# Patient Record
Sex: FEMALE | Race: WHITE | NOT HISPANIC OR LATINO | Employment: FULL TIME | URBAN - METROPOLITAN AREA
[De-identification: names, ages, dates, MRNs, and addresses within clinical notes are randomized per-mention and may not be internally consistent; named-entity substitution may affect disease eponyms.]

---

## 2017-01-12 DIAGNOSIS — R11.0 CHRONIC NAUSEA: Primary | ICD-10-CM

## 2017-01-12 RX ORDER — ONDANSETRON 4 MG/1
4 TABLET, FILM COATED ORAL 3 TIMES DAILY PRN
Qty: 42 TABLET | Refills: 5 | Status: SHIPPED | OUTPATIENT
Start: 2017-01-12 | End: 2017-07-31

## 2017-01-12 NOTE — TELEPHONE ENCOUNTER
Prescription approved per [facility] Refill [place].    Lauren Harvey RN  [facility]

## 2017-01-12 NOTE — TELEPHONE ENCOUNTER
ONDANSETRON 4 MG TABS      Last Written Prescription Date: 6/29/16  Last Fill Quantity: 42,  # refills: 5   Last Office Visit with G, P or [facility] prescribing provider: 4/22/16

## 2017-01-16 DIAGNOSIS — S13.9XXA SPRAIN OF NECK, INITIAL ENCOUNTER: Primary | ICD-10-CM

## 2017-01-16 NOTE — TELEPHONE ENCOUNTER
Cyclobenzaprine 10MG Tablets      Last Written Prescription Date:  9/7/16  Last Fill Quantity: 30,   # refills: 5  Last Office Visit with [facility], [facility] or [facility] prescribing provider: 4/22/16  Future Office visit:       Routing refill request to provider for review/approval because:  Drug not on the [facility], [facility] or [facility] refill protocol or controlled substance

## 2017-01-17 RX ORDER — CYCLOBENZAPRINE HCL 10 MG
10 TABLET ORAL
Qty: 30 TABLET | Refills: 5 | Status: SHIPPED | OUTPATIENT
Start: 2017-01-17 | End: 2017-07-31

## 2017-03-30 ENCOUNTER — OFFICE VISIT (OUTPATIENT)
Dept: FAMILY MEDICINE | Facility: CLINIC | Age: 42
End: 2017-03-30
Payer: COMMERCIAL

## 2017-03-30 VITALS — TEMPERATURE: 98 F | HEART RATE: 109 BPM | HEIGHT: 63 IN | OXYGEN SATURATION: 100 %

## 2017-03-30 DIAGNOSIS — F43.22 ADJUSTMENT DISORDER WITH ANXIETY: ICD-10-CM

## 2017-03-30 DIAGNOSIS — S13.9XXD SPRAIN OF NECK, SUBSEQUENT ENCOUNTER: ICD-10-CM

## 2017-03-30 DIAGNOSIS — M25.511 ACUTE PAIN OF RIGHT SHOULDER: Primary | ICD-10-CM

## 2017-03-30 PROCEDURE — 99213 OFFICE O/P EST LOW 20 MIN: CPT | Performed by: FAMILY MEDICINE

## 2017-03-30 NOTE — MR AVS SNAPSHOT
After Visit Summary   3/30/2017    Sade Noyola    MRN: [id number]           Patient Information     Date Of Birth          1975        Visit Information        Provider Department      3/30/2017 1:45 PM Chi Gates MD [facility]        Today's Diagnoses     Acute pain of right shoulder    -  1    Sprain of neck, subsequent encounter        Adjustment disorder with anxiety           Follow-ups after your visit        Additional Services     [facility] PT, HAND, AND CHIROPRACTIC REFERRAL       **This order will print in the [facility] Scheduling Office**    Physical Therapy, Hand Therapy and Chiropractic Care are available through:    *[facility]  *[facility]  *[facility]    Call one number to schedule at any of the above locations: [phone].    Your provider has referred you to: Physical Therapy at [facility] or [facility]    Indication/Reason for Referral: Neck Pain and Shoulder Pain  Onset of Illness: 2016  Therapy Orders: Evaluate and Treat  Special Programs: None  Special Request: None    Johanna Spencer      Additional Comments for the Therapist or Chiropractor:     Please be aware that coverage of these services is subject to the terms and limitations of your health insurance plan.  Call member services at your health plan with any benefit or coverage questions.      Please bring the following to your appointment:    *Your personal calendar for scheduling future appointments  *Comfortable clothing                  Who to contact     If you have questions or need follow up information about today's clinic visit or your schedule please contact [facility] directly at [phone].  Normal or non-critical lab and imaging results will be communicated to you by MyChart, letter or phone within 4 business days after the clinic has received the results. If you do not hear from us within 7 days, please contact the  "clinic through VouchedForhart or phone. If you have a critical or abnormal lab result, we will notify you by phone as soon as possible.  Submit refill requests through Hapzing or call your pharmacy and they will forward the refill request to us. Please allow 3 business days for your refill to be completed.          Additional Information About Your Visit        VouchedForhart Information     Hapzing lets you send messages to your doctor, view your test results, renew your prescriptions, schedule appointments and more. To sign up, go to [email or web address] . Click on \"Log in\" on the left side of the screen, which will take you to the Welcome page. Then click on \"Sign up Now\" on the right side of the page.     You will be asked to enter the access code listed below, as well as some personal information. Please follow the directions to create your username and password.     Your access code is: [email or web address]  Expires: 2017  2:48 PM     Your access code will  in 90 days. If you need help or a new code, please call your [place] clinic or [phone].        Care EveryWhere ID     This is your Care EveryWhere ID. This could be used by other organizations to access your [place] medical records  [id number]        Your Vitals Were     Pulse Temperature Height Pulse Oximetry          109 98  F (36.7  C) (Oral) 5' 3\" (1.6 m) 100%         Blood Pressure from Last 3 Encounters:   16 108/66   16 122/66   14 153/79    Weight from Last 3 Encounters:   16 118 lb (53.5 kg)   16 127 lb 3.2 oz (57.7 kg)   13 127 lb (57.6 kg)              We Performed the Following     [facility] PT, HAND, AND CHIROPRACTIC REFERRAL        Primary Care Provider Office Phone # Fax #    Chi Gates [phone]       [facility] [address]  [facility] [address]        Thank you!     Thank you for choosing [facility]  for your care. Our goal is always to " provide you with excellent care. Hearing back from our patients is one way we can continue to improve our services. Please take a few minutes to complete the written survey that you may receive in the mail after your visit with us. Thank you!             Your Updated Medication List - Protect others around you: Learn how to safely use, store and throw away your medicines at www.disposemymeds.org.          This list is accurate as of: 3/30/17  2:48 PM.  Always use your most recent med list.                   Brand Name Dispense Instructions for use    cyclobenzaprine 10 MG tablet    FLEXERIL    30 tablet    Take 1 tablet (10 mg) by mouth nightly as needed for muscle spasms       IBUPROFEN PO      Take  by mouth.       KEFLEX PO          MULTIVITAMINS PO      Take  by mouth.       ondansetron 4 MG tablet    ZOFRAN    42 tablet    Take 1 tablet (4 mg) by mouth 3 times daily as needed for nausea       PROBIOTIC PO      Take  by mouth.       TYLENOL 325 MG tablet   Generic drug:  acetaminophen     100    2 TABLETS EVERY 4 HOURS AS NEEDED

## 2017-03-30 NOTE — PROGRESS NOTES
"  SUBJECTIVE:                                                    Sade Noyola is a 41 year old female who presents to clinic today for the following health issues:      Joint Pain     Onset: Month     Description:   Location: right shoulder  Character: Dull ache    Intensity: mild better from today     Progression of Symptoms: better    Accompanying Signs & Symptoms:  Other symptoms: none    History:   Previous similar pain: no       Precipitating factors:   Trauma or overuse: no     Alleviating factors:  Improved by: nothing       Therapies Tried and outcome: Ibuprofen       Notes symptoms worse cyclically before menses and when going through stress/ anxiety ie work and family issues    Problem list and histories reviewed & adjusted, as indicated.  Additional history: as documented    Labs reviewed in EPIC    Reviewed and updated as needed this visit by clinical staff       Reviewed and updated as needed this visit by Provider         ROS:  Constitutional, HEENT, cardiovascular, pulmonary, gi and gu systems are negative, except as otherwise noted.    OBJECTIVE:                                                    Pulse 109  Temp 98  F (36.7  C) (Oral)  Ht 5' 3\" (1.6 m)  SpO2 100%  There is no height or weight on file to calculate BMI.  GENERAL: alert and fatigued  EYES: Eyes grossly normal to inspection, PERRL and conjunctivae and sclerae normal  HENT: ear canals and TM's normal, nose and mouth without ulcers or lesions  NECK: no adenopathy, no asymmetry, masses, or scars and thyroid normal to palpation  MS: peripheral pulses normal and Shoulder exam:  no swelling,   ligaments intact, FROM shoulder joint, subdeltoid tenderness, bicipital groove tenderness, remainder of shoulder exam is normal.  SKIN: no suspicious lesions or rashes  PSYCH: mentation appears normal, anxious and judgement and insight intact         ASSESSMENT/PLAN:                                                            1. Acute pain of " right shoulder  Worsened by stress  - [facility] PT, HAND, AND CHIROPRACTIC REFERRAL    NSAID, ice suggested  activity modification  referral to physical therapy  see primary care physician in follow up       2. Sprain of neck, subsequent encounter  As above  - [facility] PT, HAND, AND CHIROPRACTIC REFERRAL    3. Adjustment disorder with anxiety   family therapy/ exercise  Follow up only if unimproved.     Chi Gates MD  [facility]

## 2017-03-30 NOTE — PROGRESS NOTES
"Chief Complaint   Patient presents with     Musculoskeletal Problem       Initial Pulse 109  Temp 98  F (36.7  C) (Oral)  Ht 5' 3\" (1.6 m)  SpO2 100% Estimated body mass index is 20.9 kg/(m^2) as calculated from the following:    Height as of 7/17/16: 5' 3\" (1.6 m).    Weight as of 7/17/16: 118 lb (53.5 kg).  Medication Reconciliation: complete     Daria Rizvi MA      "

## 2017-04-29 ENCOUNTER — TRANSFERRED RECORDS (OUTPATIENT)
Dept: HEALTH INFORMATION MANAGEMENT | Facility: CLINIC | Age: 42
End: 2017-04-29

## 2017-05-03 ENCOUNTER — TRANSFERRED RECORDS (OUTPATIENT)
Dept: HEALTH INFORMATION MANAGEMENT | Facility: CLINIC | Age: 42
End: 2017-05-03

## 2017-05-12 ENCOUNTER — OFFICE VISIT (OUTPATIENT)
Dept: FAMILY MEDICINE | Facility: CLINIC | Age: 42
End: 2017-05-12
Payer: COMMERCIAL

## 2017-05-12 VITALS — HEIGHT: 63 IN | HEART RATE: 109 BPM | OXYGEN SATURATION: 99 % | TEMPERATURE: 98.8 F

## 2017-05-12 DIAGNOSIS — L50.9 HIVES: Primary | ICD-10-CM

## 2017-05-12 PROCEDURE — 99213 OFFICE O/P EST LOW 20 MIN: CPT | Performed by: FAMILY MEDICINE

## 2017-05-12 NOTE — MR AVS SNAPSHOT
"              After Visit Summary   2017    Sade Noyola    MRN: [id number]           Patient Information     Date Of Birth          1975        Visit Information        Provider Department      2017 2:30 PM Chi Gaets MD [facility]        Today's Diagnoses     Hives    -  1       Follow-ups after your visit        Who to contact     If you have questions or need follow up information about today's clinic visit or your schedule please contact [facility] directly at [phone].  Normal or non-critical lab and imaging results will be communicated to you by Qeexohart, letter or phone within 4 business days after the clinic has received the results. If you do not hear from us within 7 days, please contact the clinic through Qeexohart or phone. If you have a critical or abnormal lab result, we will notify you by phone as soon as possible.  Submit refill requests through Oyster or call your pharmacy and they will forward the refill request to us. Please allow 3 business days for your refill to be completed.          Additional Information About Your Visit        MyChart Information     Oyster lets you send messages to your doctor, view your test results, renew your prescriptions, schedule appointments and more. To sign up, go to [email or web address] . Click on \"Log in\" on the left side of the screen, which will take you to the Welcome page. Then click on \"Sign up Now\" on the right side of the page.     You will be asked to enter the access code listed below, as well as some personal information. Please follow the directions to create your username and password.     Your access code is: [email or web address]  Expires: 2017  2:48 PM     Your access code will  in 90 days. If you need help or a new code, please call your [facility] or [phone].        Care EveryWhere ID     This is your Care EveryWhere ID. This could be used by other " "organizations to access your [place] medical records  [id number]        Your Vitals Were     Pulse Temperature Height Pulse Oximetry          109 98.8  F (37.1  C) (Oral) 5' 3\" (1.6 m) 99%         Blood Pressure from Last 3 Encounters:   07/17/16 108/66   02/19/16 122/66   12/16/14 153/79    Weight from Last 3 Encounters:   07/17/16 118 lb (53.5 kg)   02/19/16 127 lb 3.2 oz (57.7 kg)   06/20/13 127 lb (57.6 kg)              Today, you had the following     No orders found for display       Primary Care Provider Office Phone # Fax #    Chi Gates [phone]       [facility] [address] [facility] [address]  [facility] [address]        Thank you!     Thank you for choosing [facility]  for your care. Our goal is always to provide you with excellent care. Hearing back from our patients is one way we can continue to improve our services. Please take a few minutes to complete the written survey that you may receive in the mail after your visit with us. Thank you!             Your Updated Medication List - Protect others around you: Learn how to safely use, store and throw away your medicines at www.disposemymeds.org.          This list is accurate as of: 5/12/17  3:07 PM.  Always use your most recent med list.                   Brand Name Dispense Instructions for use    cyclobenzaprine 10 MG tablet    FLEXERIL    30 tablet    Take 1 tablet (10 mg) by mouth nightly as needed for muscle spasms       IBUPROFEN PO      Take  by mouth.       KEFLEX PO          MULTIVITAMINS PO      Take  by mouth.       ondansetron 4 MG tablet    ZOFRAN    42 tablet    Take 1 tablet (4 mg) by mouth 3 times daily as needed for nausea       PROBIOTIC PO      Take  by mouth.       TYLENOL 325 MG tablet   Generic drug:  acetaminophen     100    2 TABLETS EVERY 4 HOURS AS NEEDED         "

## 2017-05-12 NOTE — PROGRESS NOTES
"Chief Complaint   Patient presents with     Derm Problem       Initial Pulse 109  Temp 98.8  F (37.1  C) (Oral)  Ht 5' 3\" (1.6 m)  SpO2 99% Estimated body mass index is 20.9 kg/(m^2) as calculated from the following:    Height as of 7/17/16: 5' 3\" (1.6 m).    Weight as of 7/17/16: 118 lb (53.5 kg).  Medication Reconciliation: complete     Daria Rizvi MA      "

## 2017-05-13 ENCOUNTER — TELEPHONE (OUTPATIENT)
Dept: NURSING | Facility: CLINIC | Age: 42
End: 2017-05-13

## 2017-05-13 NOTE — TELEPHONE ENCOUNTER
Call Type: Triage Call    Presenting Problem: Seen for HIves of unknown orgin  24 hours ago by  Dr Gates - who  recommended claritin but Rx  not working . Spoke  to her pharmacist , and recommended  switching to Zyrtec and that  she could take Zyrtec now after taking  on Claritin today already .  FNA encourage her to follow  pharmacist recommendation  for  switching OTC Rx; because pharmacist would be the expert in  pharmaceutical area and call back if any further problem.  Triage Note:  Guideline Title: No Guideline - Advice Per Reference (Adult)  Recommended Disposition: Call Local Agency Immediately  Original Inclination: Did not know what to do  Override Disposition:  Intended Action: Follow advice given  Physician Contacted: No  CALL LOCAL AGENCY IMMEDIATELY ?  YES  SEE ED IMMEDIATELY ? NO  CALL [facility] IMMEDIATELY ? NO  ACTIVATE  ? NO  CALL PROVIDER IMMEDIATELY ? NO  Physician Instructions:  Care Advice:

## 2017-05-15 ENCOUNTER — TRANSFERRED RECORDS (OUTPATIENT)
Dept: HEALTH INFORMATION MANAGEMENT | Facility: CLINIC | Age: 42
End: 2017-05-15

## 2017-05-23 ENCOUNTER — TELEPHONE (OUTPATIENT)
Dept: FAMILY MEDICINE | Facility: CLINIC | Age: 42
End: 2017-05-23

## 2017-05-23 DIAGNOSIS — L50.9 HIVES: Primary | ICD-10-CM

## 2017-05-23 RX ORDER — HYDROXYZINE PAMOATE 25 MG/1
25 CAPSULE ORAL 3 TIMES DAILY PRN
Qty: 30 CAPSULE | Refills: 1 | Status: SHIPPED | OUTPATIENT
Start: 2017-05-23 | End: 2020-03-09

## 2017-05-23 NOTE — TELEPHONE ENCOUNTER
Reason for call:  Patient reporting a symptom    Symptom or request: Sade is calling to say that she is having a reaction to a medication that Dr. Gates prescribed for her and has hives    Duration (how long have symptoms been present):     Have you been treated for this before?     Additional comments:     Phone Number patient can be reached at:  [phone]    Best Time:  anytime    Can we leave a detailed message on this number:  Not Applicable    Call taken on 5/23/2017 at 5:12 PM by Qi Paz

## 2017-05-23 NOTE — TELEPHONE ENCOUNTER
Called patient. Informed of MD message. She verbalizes her understanding, denies further questions at this time. She is coming in to clinic for follow up on 5/26     Lauren Harvey RN  [facility]

## 2017-05-23 NOTE — TELEPHONE ENCOUNTER
She can also try  Orders Placed This Encounter     hydrOXYzine (VISTARIL) 25 MG capsule     Sig: Take 1 capsule (25 mg) by mouth 3 times daily as needed for itching     Dispense:  30 capsule     Refill:  1

## 2017-05-23 NOTE — TELEPHONE ENCOUNTER
Telephone call received from patient. She reports hives on her arms, forearms and elbows which first appeared just prior to 5/12/17 visit. She states that overall the hives have improved but states that the hives flare-up on occasion.     She is currently taking: Claritin, benadryl at night and using hydrocortisone cream.     Patient is tearful and anxious on the phone. She denies shortness of breath. No anaphylaxis. She is voiding and having regular bowel movements. She reports not changing her detergent, denies food allergies. She reports drinking a lot of water.     Dr. Gates,     Patient called to ask if there is anything additional she should take related to hives. She is coming in to see you on Friday. She requests to speak with you directly.     Patient was informed that MD or nurse will return her call. Patient states her understanding, denies further questions at this time.     Thank you,  Lauren Harvey RN  [facility]

## 2017-05-26 ENCOUNTER — OFFICE VISIT (OUTPATIENT)
Dept: FAMILY MEDICINE | Facility: CLINIC | Age: 42
End: 2017-05-26
Payer: COMMERCIAL

## 2017-05-26 VITALS — HEART RATE: 116 BPM | TEMPERATURE: 99.4 F | HEIGHT: 63 IN | OXYGEN SATURATION: 96 %

## 2017-05-26 DIAGNOSIS — L50.9 URTICARIA: Primary | ICD-10-CM

## 2017-05-26 LAB
ERYTHROCYTE [DISTWIDTH] IN BLOOD BY AUTOMATED COUNT: 13.1 % (ref 10–15)
HCT VFR BLD AUTO: 43.7 % (ref 35–47)
HGB BLD-MCNC: 14.8 G/DL (ref 11.7–15.7)
MCH RBC QN AUTO: 30.6 PG (ref 26.5–33)
MCHC RBC AUTO-ENTMCNC: 33.9 G/DL (ref 31.5–36.5)
MCV RBC AUTO: 90 FL (ref 78–100)
PLATELET # BLD AUTO: 311 10E9/L (ref 150–450)
RBC # BLD AUTO: 4.84 10E12/L (ref 3.8–5.2)
WBC # BLD AUTO: 10.7 10E9/L (ref 4–11)

## 2017-05-26 PROCEDURE — 85027 COMPLETE CBC AUTOMATED: CPT | Performed by: FAMILY MEDICINE

## 2017-05-26 PROCEDURE — 36415 COLL VENOUS BLD VENIPUNCTURE: CPT | Performed by: FAMILY MEDICINE

## 2017-05-26 PROCEDURE — 99213 OFFICE O/P EST LOW 20 MIN: CPT | Performed by: FAMILY MEDICINE

## 2017-05-26 NOTE — MR AVS SNAPSHOT
"              After Visit Summary   2017    Sade Noyola    MRN: [id number]           Patient Information     Date Of Birth          1975        Visit Information        Provider Department      2017 4:00 PM Chi Gates MD [facility]        Today's Diagnoses     Urticaria    -  1       Follow-ups after your visit        Who to contact     If you have questions or need follow up information about today's clinic visit or your schedule please contact [facility] directly at [phone].  Normal or non-critical lab and imaging results will be communicated to you by Inoappshart, letter or phone within 4 business days after the clinic has received the results. If you do not hear from us within 7 days, please contact the clinic through Inoappshart or phone. If you have a critical or abnormal lab result, we will notify you by phone as soon as possible.  Submit refill requests through [employer] or call your pharmacy and they will forward the refill request to us. Please allow 3 business days for your refill to be completed.          Additional Information About Your Visit        MyChart Information     [employer] lets you send messages to your doctor, view your test results, renew your prescriptions, schedule appointments and more. To sign up, go to [email or web address] . Click on \"Log in\" on the left side of the screen, which will take you to the Welcome page. Then click on \"Sign up Now\" on the right side of the page.     You will be asked to enter the access code listed below, as well as some personal information. Please follow the directions to create your username and password.     Your access code is: [email or web address]  Expires: 2017  2:48 PM     Your access code will  in 90 days. If you need help or a new code, please call your [facility] or [phone].        Care EveryWhere ID     This is your Care EveryWhere ID. This could be used by other " "organizations to access your [place] medical records  [id number]        Your Vitals Were     Pulse Temperature Height Pulse Oximetry          116 99.4  F (37.4  C) (Oral) 5' 3\" (1.6 m) 96%         Blood Pressure from Last 3 Encounters:   07/17/16 108/66   02/19/16 122/66   12/16/14 153/79    Weight from Last 3 Encounters:   07/17/16 118 lb (53.5 kg)   02/19/16 127 lb 3.2 oz (57.7 kg)   06/20/13 127 lb (57.6 kg)              We Performed the Following     CBC with platelets        Primary Care Provider Office Phone # Fax #    Chi Jack Gates [phone]       [facility] [address] [facility] [address]  [facility] [address]        Thank you!     Thank you for choosing [facility]  for your care. Our goal is always to provide you with excellent care. Hearing back from our patients is one way we can continue to improve our services. Please take a few minutes to complete the written survey that you may receive in the mail after your visit with us. Thank you!             Your Updated Medication List - Protect others around you: Learn how to safely use, store and throw away your medicines at www.disposemymeds.org.          This list is accurate as of: 5/26/17  4:47 PM.  Always use your most recent med list.                   Brand Name Dispense Instructions for use    cyclobenzaprine 10 MG tablet    FLEXERIL    30 tablet    Take 1 tablet (10 mg) by mouth nightly as needed for muscle spasms       hydrOXYzine 25 MG capsule    VISTARIL    30 capsule    Take 1 capsule (25 mg) by mouth 3 times daily as needed for itching       IBUPROFEN PO      Take  by mouth.       KEFLEX PO          MULTIVITAMINS PO      Take  by mouth.       ondansetron 4 MG tablet    ZOFRAN    42 tablet    Take 1 tablet (4 mg) by mouth 3 times daily as needed for nausea       PROBIOTIC PO      Take  by mouth.       TYLENOL 325 MG tablet   Generic drug:  acetaminophen     100    2 TABLETS EVERY 4 HOURS AS NEEDED "

## 2017-05-26 NOTE — NURSING NOTE
"No chief complaint on file.      Initial Pulse 116  Temp 99.4  F (37.4  C) (Oral)  Ht 5' 3\" (1.6 m)  SpO2 96% Estimated body mass index is 20.9 kg/(m^2) as calculated from the following:    Height as of 7/17/16: 5' 3\" (1.6 m).    Weight as of 7/17/16: 118 lb (53.5 kg).  Medication Reconciliation: complete     Halley Walker MA      "

## 2017-05-26 NOTE — PROGRESS NOTES
"  SUBJECTIVE:                                                    Sade Noyola is a 42 year old female who presents to clinic today for the following health issues:    Concern - Follow up on hives from 5/12 visit     Onset: 2 weeks and 1 day ago    Description:   Forearms    Intensity: much better on arms, and saw them on her face when she was in the car    Progression of Symptoms:  improving    Accompanying Signs & Symptoms:  Warm from being outside       Previous history of similar problem:   No     Precipitating factors:   Worsened by: being in car    Alleviating factors:  Improved by: claritin, [id number]       Therapies Tried and outcome: Claritin       SAW derm ( not helpful)    Problem list and histories reviewed & adjusted, as indicated.  Additional history: as documented    Labs reviewed in EPIC    Reviewed and updated as needed this visit by clinical staff  Tobacco  Allergies  Meds       Reviewed and updated as needed this visit by Provider         ROS:  Constitutional, HEENT, cardiovascular, pulmonary, gi and gu systems are negative, except as otherwise noted.    OBJECTIVE:                                                    Pulse 116  Temp 99.4  F (37.4  C) (Oral)  Ht 5' 3\" (1.6 m)  SpO2 96%  There is no height or weight on file to calculate BMI.  GENERAL: alert and fatigued  NECK: no adenopathy, no asymmetry, masses, or scars and thyroid normal to palpation  RESP: lungs clear to auscultation - no rales, rhonchi or wheezes  CV: regular rate and rhythm, normal S1 S2, no S3 or S4, no murmur, click or rub, no peripheral edema and peripheral pulses strong  ABDOMEN: soft, nontender, no hepatosplenomegaly, no masses and bowel sounds normal  SKIN: wheal - face and lower legs  NEURO: Normal strength and tone, mentation intact and speech normal  PSYCH: mentation appears normal, affect normal/bright  LYMPH: no cervical, supraclavicular, axillary, or inguinal adenopathy       Results for orders placed " or performed in visit on 05/26/17   CBC with platelets   Result Value Ref Range    WBC 10.7 4.0 - 11.0 10e9/L    RBC Count 4.84 3.8 - 5.2 10e12/L    Hemoglobin 14.8 11.7 - 15.7 g/dL    Hematocrit 43.7 35.0 - 47.0 %    MCV 90 78 - 100 fl    MCH 30.6 26.5 - 33.0 pg    MCHC 33.9 31.5 - 36.5 g/dL    RDW 13.1 10.0 - 15.0 %    Platelet Count 311 150 - 450 10e9/L         ASSESSMENT/PLAN:                                                            1. Urticaria  Unclear if postviral vs due to cephalexin  - CBC with platelets    Continue claritin, reassurance   Follow up only if unimproved.   See Patient Instructions    Chi Gates MD  [facility]

## 2017-06-10 ENCOUNTER — HEALTH MAINTENANCE LETTER (OUTPATIENT)
Age: 42
End: 2017-06-10

## 2017-06-12 ENCOUNTER — OFFICE VISIT (OUTPATIENT)
Dept: FAMILY MEDICINE | Facility: CLINIC | Age: 42
End: 2017-06-12
Payer: COMMERCIAL

## 2017-06-12 DIAGNOSIS — L50.9 URTICARIA: Primary | ICD-10-CM

## 2017-06-12 PROCEDURE — 99212 OFFICE O/P EST SF 10 MIN: CPT | Performed by: FAMILY MEDICINE

## 2017-06-12 NOTE — MR AVS SNAPSHOT
"              After Visit Summary   2017    Sade Noyola    MRN: [id number]           Patient Information     Date Of Birth          1975        Visit Information        Provider Department      2017 4:00 PM Chi Gates MD [facility]        Today's Diagnoses     Urticaria    -  1       Follow-ups after your visit        Who to contact     If you have questions or need follow up information about today's clinic visit or your schedule please contact [facility] directly at [phone].  Normal or non-critical lab and imaging results will be communicated to you by VitalFieldshart, letter or phone within 4 business days after the clinic has received the results. If you do not hear from us within 7 days, please contact the clinic through VitalFieldshart or phone. If you have a critical or abnormal lab result, we will notify you by phone as soon as possible.  Submit refill requests through Dajiabao or call your pharmacy and they will forward the refill request to us. Please allow 3 business days for your refill to be completed.          Additional Information About Your Visit        MyChart Information     Dajiabao lets you send messages to your doctor, view your test results, renew your prescriptions, schedule appointments and more. To sign up, go to [email or web address] . Click on \"Log in\" on the left side of the screen, which will take you to the Welcome page. Then click on \"Sign up Now\" on the right side of the page.     You will be asked to enter the access code listed below, as well as some personal information. Please follow the directions to create your username and password.     Your access code is: [email or web address]  Expires: 2017  2:48 PM     Your access code will  in 90 days. If you need help or a new code, please call your [facility] or [phone].        Care EveryWhere ID     This is your Care EveryWhere ID. This could be used by other " organizations to access your [place] medical records  [id number]         Blood Pressure from Last 3 Encounters:   07/17/16 108/66   02/19/16 122/66   12/16/14 153/79    Weight from Last 3 Encounters:   07/17/16 118 lb (53.5 kg)   02/19/16 127 lb 3.2 oz (57.7 kg)   06/20/13 127 lb (57.6 kg)              Today, you had the following     No orders found for display       Primary Care Provider Office Phone # Fax #    Chi Jack Gates [phone]       [facility] [address]  [facility] [address]        Thank you!     Thank you for choosing [facility]  for your care. Our goal is always to provide you with excellent care. Hearing back from our patients is one way we can continue to improve our services. Please take a few minutes to complete the written survey that you may receive in the mail after your visit with us. Thank you!             Your Updated Medication List - Protect others around you: Learn how to safely use, store and throw away your medicines at www.disposemymeds.org.          This list is accurate as of: 6/12/17  5:09 PM.  Always use your most recent med list.                   Brand Name Dispense Instructions for use    cyclobenzaprine 10 MG tablet    FLEXERIL    30 tablet    Take 1 tablet (10 mg) by mouth nightly as needed for muscle spasms       hydrOXYzine 25 MG capsule    VISTARIL    30 capsule    Take 1 capsule (25 mg) by mouth 3 times daily as needed for itching       IBUPROFEN PO      Take  by mouth.       KEFLEX PO          MULTIVITAMINS PO      Take  by mouth.       ondansetron 4 MG tablet    ZOFRAN    42 tablet    Take 1 tablet (4 mg) by mouth 3 times daily as needed for nausea       PROBIOTIC PO      Take  by mouth.       TYLENOL 325 MG tablet   Generic drug:  acetaminophen     100    2 TABLETS EVERY 4 HOURS AS NEEDED

## 2017-06-12 NOTE — PROGRESS NOTES
SUBJECTIVE:                                                    Sade Noyola is a 42 year old female who presents to clinic today for the following health issues:      Rash     Onset: a few weeks ago    Description:   Location: generalized  Character: blanches to palpitation  Itching (Pruritis): YES    Progression of Symptoms:  improving    Accompanying Signs & Symptoms:  Fever: no   Body aches or joint pain: no   Sore throat symptoms: no   Recent cold symptoms: no    History:   Previous similar rash: no     Precipitating factors:   Exposure to similar rash: no   New exposures: None   Recent travel: no     Alleviating factors:  Claritin/ prednisone     Therapies Tried and outcome: medications, helping or rash just going away          Problem list and histories reviewed & adjusted, as indicated.  Additional history: as documented    Labs reviewed in EPIC    Reviewed and updated as needed this visit by clinical staff       Reviewed and updated as needed this visit by Provider         ROS:  Constitutional, HEENT, cardiovascular, pulmonary, gi and gu systems are negative, except as otherwise noted.    OBJECTIVE:                                                    There were no vitals taken for this visit.  There is no height or weight on file to calculate BMI.  GENERAL: healthy, alert and no distress  NECK: no adenopathy, no asymmetry, masses, or scars and thyroid normal to palpation  RESP: lungs clear to auscultation - no rales, rhonchi or wheezes  CV: regular rate and rhythm, normal S1 S2, no S3 or S4, no murmur, click or rub, no peripheral edema and peripheral pulses strong  ABDOMEN: soft, nontender, no hepatosplenomegaly, no masses and bowel sounds normal  SKIN: maculopapular eruption - and upper chest and arms         ASSESSMENT/PLAN:                                                            1. Urticaria  Improving, use antihistamine as needed  Follow up only if unimproved.       See Patient  Instructions    Chi Gates MD  [facility]

## 2017-06-14 ENCOUNTER — TELEPHONE (OUTPATIENT)
Dept: FAMILY MEDICINE | Facility: CLINIC | Age: 42
End: 2017-06-14

## 2017-06-14 NOTE — TELEPHONE ENCOUNTER
Reason for call:  Other   Patient called regarding (reason for call): call back  Additional comments: Pt stated she was following up on an allergic reaction she had. She was seen in clinic by Dr. Gates on 5/26/17 and 6/12/17, and had some more questions regarding some of the symptoms. Did not elaborate further    Phone number to reach patient:  Home number on file [phone] (home)    Best Time:  Any    Can we leave a detailed message on this number?  YES

## 2017-06-14 NOTE — TELEPHONE ENCOUNTER
"Situation:Patient with increased anxiety due to \"tickle\" in throat that woke patient up last night and caused a hard cough. After cough patient felt heart racing and her back tensed up. Patient stated \" I was worried I was having a heart attack.\" Patient states she has had increased anxiety due to recent travels with  and children. Symptoms went away after patient calmed down. Denied SOB, chest pressure, chest pain, radiating pain or lightheadedness. Patient confirms she is \"very anxious about health issues\"    Background: patient has no Cardiovascular hx patient has hx of  Adjustment disorder/anxiety disorder    Assessment: Patient with \"racing\" speech and seems very anxious over the phone.     Recommendations: Patient instructed to be aware of anxiety responses such as racing heart beat, palpitations, becoming tense and increased respirations and diaphoresis. Patient instructed to find a quiet spot and do some deep breathing and become aware of triggers and respond with activities that decrease anxiety and induce calm.       Thanks! Marilee Carrillo RN    "

## 2017-06-29 ENCOUNTER — TRANSFERRED RECORDS (OUTPATIENT)
Dept: HEALTH INFORMATION MANAGEMENT | Facility: CLINIC | Age: 42
End: 2017-06-29

## 2017-07-31 ENCOUNTER — TELEPHONE (OUTPATIENT)
Dept: NURSING | Facility: CLINIC | Age: 42
End: 2017-07-31

## 2017-07-31 ENCOUNTER — NURSE TRIAGE (OUTPATIENT)
Dept: NURSING | Facility: CLINIC | Age: 42
End: 2017-07-31

## 2017-07-31 DIAGNOSIS — S13.9XXA SPRAIN OF NECK, INITIAL ENCOUNTER: ICD-10-CM

## 2017-07-31 DIAGNOSIS — R11.0 CHRONIC NAUSEA: ICD-10-CM

## 2017-07-31 RX ORDER — ONDANSETRON 4 MG/1
TABLET, FILM COATED ORAL
Qty: 42 TABLET | Refills: 1 | Status: SHIPPED | OUTPATIENT
Start: 2017-07-31 | End: 2017-11-20

## 2017-07-31 RX ORDER — CYCLOBENZAPRINE HCL 10 MG
10 TABLET ORAL
Qty: 30 TABLET | Refills: 0 | Status: SHIPPED | OUTPATIENT
Start: 2017-07-31 | End: 2017-08-30

## 2017-07-31 NOTE — TELEPHONE ENCOUNTER
ondansetron (ZOFRAN) 4 MG tablet  Last Written Prescription Date: 1/12/17  Last Fill Quantity: 42,  # refills: 5  Last Office Visit with [facility], [facility] or [facility] prescribing provider: 6/12/17    Call to pharmacy, patient has no further refills on file. Last filled on 5/8/17 and 6/13/17    Prescription approved per [facility] Refill Protocol.    Lauren Harvey RN  [facility]

## 2017-07-31 NOTE — TELEPHONE ENCOUNTER
"  Reason for Disposition    Caller requesting a NON-URGENT new prescription or refill and triager unable to refill per unit policy    Additional Information    Negative: Drug overdose and nurse unable to answer question    Negative: Caller requesting information not related to medicine    Negative: Caller requesting a prescription for Strep throat and has a positive culture result    Negative: Rash while taking a medication or within 3 days of stopping it    Negative: Immunization reaction suspected    Negative: [1] Asthma and [2] having symptoms of asthma (cough, wheezing, etc)    Negative: MORE THAN A DOUBLE DOSE of a prescription or over-the-counter (OTC) drug    Negative: [1] DOUBLE DOSE (an extra dose or lesser amount) of over-the-counter (OTC) drug AND [2] any symptoms (e.g., dizziness, nausea, pain, sleepiness)    Negative: [1] DOUBLE DOSE (an extra dose or lesser amount) of prescription drug AND [2] any symptoms (e.g., dizziness, nausea, pain, sleepiness)    Negative: Took another person's prescription drug    Negative: [1] DOUBLE DOSE (an extra dose or lesser amount) of prescription drug AND [2] NO symptoms (Exception: a double dose of antibiotics)    Negative: Diabetes drug error or overdose (e.g., insulin or extra dose)    Negative: [1] Request for URGENT new prescription or refill of \"essential\" medication (i.e., likelihood of harm to patient if not taken) AND [2] triager unable to fill per unit policy    Negative: [1] Prescription not at pharmacy AND [2] was prescribed today by PCP    Negative: Pharmacy calling with prescription questions and triager unable to answer question    Negative: Caller has URGENT medication question about med that PCP prescribed and triager unable to answer question    Negative: Caller has NON-URGENT medication question about med that PCP prescribed and triager unable to answer question    Protocols used: MEDICATION QUESTION CALL-ADULT-    "

## 2017-07-31 NOTE — TELEPHONE ENCOUNTER
Patient requesting refill of:     cyclobenzaprine (FLEXERIL) 10 MG tablets    Call to pharmacy, patient has no refills on file.  Last filled on 7/1/17      Routing to Dr. Gates.         Lauren Harvey RN  [facility]

## 2017-07-31 NOTE — TELEPHONE ENCOUNTER
"Patient states she is out of town and she is \"Out\" of Cyclobenzaprine and is experiencing (L) jaw pain from grinding her teeth.  Reqeusts prescription refill to be sent to [facility] listed in [facility] and she will request a transfer herself.     Patient may be contacted at: [phone].     Patient advised to call back if you do not hear from clinic or any further concerns.     Thank you.     Isabel Lester RN [facility]             "

## 2017-07-31 NOTE — TELEPHONE ENCOUNTER
"Clinic Action Needed: Yes.   Reason for Call:Patient states she is out of town and she is \"Out\" of Cyclobenzaprine and is experiencing (L) jaw pain from grinding her teeth.  Reqeusts prescription refill to be sent to [facility] listed in [facility] and she will request a transfer herself.     Patient may be contacted at: [phone].     Patient advised to call back if you do not hear from clinic or any further concerns.     Thank you.     Isabel Tressler, RN [facility]       Routed to: RN Pool.     Thank you.     Isabel Tressler, RN [facility]         "

## 2017-08-30 DIAGNOSIS — S13.9XXA SPRAIN OF NECK, INITIAL ENCOUNTER: ICD-10-CM

## 2017-08-30 RX ORDER — CYCLOBENZAPRINE HCL 10 MG
TABLET ORAL
Qty: 30 TABLET | Refills: 0 | Status: SHIPPED | OUTPATIENT
Start: 2017-08-30 | End: 2017-09-22

## 2017-08-30 RX ORDER — CYCLOBENZAPRINE HCL 10 MG
TABLET ORAL
Qty: 30 TABLET | Refills: 0 | OUTPATIENT
Start: 2017-08-30

## 2017-08-30 NOTE — TELEPHONE ENCOUNTER
CYCLOBENZAPRINE 10MG TABLETS        Last Written Prescription Date:  7/31/17  Last Fill Quantity: 30,   # refills: 0  Last Office Visit with [facility], [facility] or [facility] prescribing provider: 6/12/17  Future Office visit:       Routing refill request to provider for review/approval because:  Drug not on the [facility], [facility] or [facility] refill protocol or controlled substance

## 2017-08-30 NOTE — TELEPHONE ENCOUNTER
Routing refill request to provider for review/approval because:  Drug not on the [facility] refill protocol     Claribel Velasquez RN   [facility]

## 2017-09-22 DIAGNOSIS — S13.9XXA SPRAIN OF NECK, INITIAL ENCOUNTER: ICD-10-CM

## 2017-09-22 RX ORDER — CYCLOBENZAPRINE HCL 10 MG
TABLET ORAL
Qty: 30 TABLET | Refills: 0 | Status: SHIPPED | OUTPATIENT
Start: 2017-09-22 | End: 2017-10-22

## 2017-09-22 NOTE — TELEPHONE ENCOUNTER
Pt has enough meds to get her through until Monday, 9/25/17. She stated that the prescription usually has 4 or 5 refills on it, but the last 2 times there haven't been any. She would like there to be a few refills this time so she doesn't have to call every month to get it filled.    cyclobenzaprine (FLEXERIL) 10 MG tablet       Last Written Prescription Date:  8/30/2017  Last Fill Quantity: 30,   # refills: 0  Last Office Visit with [facility] or [facility] prescribing provider: 6/12/2017  Future Office visit:       Routing refill request to provider for review/approval because:  Drug not on the [facility], [facility] or [facility] refill protocol or controlled substance

## 2017-09-22 NOTE — TELEPHONE ENCOUNTER
Routing refill request to provider for review/approval because:  Drug not on the [facility] refill protocol     Malini Park RN  [facility]

## 2017-10-22 ENCOUNTER — TELEPHONE (OUTPATIENT)
Dept: FAMILY MEDICINE | Facility: CLINIC | Age: 42
End: 2017-10-22

## 2017-10-22 DIAGNOSIS — S13.9XXA SPRAIN OF NECK, INITIAL ENCOUNTER: ICD-10-CM

## 2017-10-23 RX ORDER — CYCLOBENZAPRINE HCL 10 MG
TABLET ORAL
Qty: 30 TABLET | Refills: 0 | Status: SHIPPED | OUTPATIENT
Start: 2017-10-23 | End: 2017-10-23

## 2017-10-23 RX ORDER — CYCLOBENZAPRINE HCL 10 MG
TABLET ORAL
Qty: 90 TABLET | Refills: 1 | Status: SHIPPED | OUTPATIENT
Start: 2017-10-23 | End: 2019-02-19

## 2017-10-23 NOTE — TELEPHONE ENCOUNTER
Routing refill request to provider for review/approval because:  Drug not on the [facility] refill protocol   Pt is requesting more than 1 month supply    Malini Park RN  [facility]

## 2017-10-23 NOTE — TELEPHONE ENCOUNTER
CYCLOBENZAPRINE 10MG TABLETS        Last Written Prescription Date:  9/22/17  Last Fill Quantity: 30,   # refills: 0  Future Office visit:       Routing refill request to provider for review/approval because:  Does not meet protocol criteria  LOV: 6/12/17

## 2017-11-07 ENCOUNTER — OFFICE VISIT (OUTPATIENT)
Dept: FAMILY MEDICINE | Facility: CLINIC | Age: 42
End: 2017-11-07
Payer: COMMERCIAL

## 2017-11-07 VITALS
TEMPERATURE: 99 F | HEART RATE: 122 BPM | OXYGEN SATURATION: 98 % | SYSTOLIC BLOOD PRESSURE: 132 MMHG | DIASTOLIC BLOOD PRESSURE: 81 MMHG

## 2017-11-07 DIAGNOSIS — L50.9 HIVES: ICD-10-CM

## 2017-11-07 DIAGNOSIS — R07.89 CHEST WALL PAIN: Primary | ICD-10-CM

## 2017-11-07 DIAGNOSIS — F43.0 ACUTE REACTION TO STRESS: ICD-10-CM

## 2017-11-07 PROCEDURE — 80053 COMPREHEN METABOLIC PANEL: CPT | Performed by: FAMILY MEDICINE

## 2017-11-07 PROCEDURE — 36415 COLL VENOUS BLD VENIPUNCTURE: CPT | Performed by: FAMILY MEDICINE

## 2017-11-07 PROCEDURE — 99214 OFFICE O/P EST MOD 30 MIN: CPT | Performed by: FAMILY MEDICINE

## 2017-11-07 PROCEDURE — 84443 ASSAY THYROID STIM HORMONE: CPT | Performed by: FAMILY MEDICINE

## 2017-11-07 PROCEDURE — 93000 ELECTROCARDIOGRAM COMPLETE: CPT | Performed by: FAMILY MEDICINE

## 2017-11-07 NOTE — NURSING NOTE
"Chief Complaint   Patient presents with     Derm Problem       Initial /90 (BP Location: Right arm, Patient Position: Chair, Cuff Size: Adult Regular)  Pulse 122  Temp 99  F (37.2  C) (Oral)  SpO2 98% Estimated body mass index is 20.9 kg/(m^2) as calculated from the following:    Height as of 7/17/16: 5' 3\" (1.6 m).    Weight as of 7/17/16: 118 lb (53.5 kg).  Medication Reconciliation: complete     Mouna Felix CMA    "

## 2017-11-07 NOTE — MR AVS SNAPSHOT
"              After Visit Summary   2017    Sade Noyola    MRN: [id number]           Patient Information     Date Of Birth          1975        Visit Information        Provider Department      2017 2:30 PM Chi Gates MD [facility]        Today's Diagnoses     Chest wall pain    -  1    Hives        Acute reaction to stress           Follow-ups after your visit        Who to contact     If you have questions or need follow up information about today's clinic visit or your schedule please contact [facility] directly at [phone].  Normal or non-critical lab and imaging results will be communicated to you by A Green Night's Sleephart, letter or phone within 4 business days after the clinic has received the results. If you do not hear from us within 7 days, please contact the clinic through A [employer]'s Sleephart or phone. If you have a critical or abnormal lab result, we will notify you by phone as soon as possible.  Submit refill requests through Calibrus or call your pharmacy and they will forward the refill request to us. Please allow 3 business days for your refill to be completed.          Additional Information About Your Visit        MyChart Information     Calibrus lets you send messages to your doctor, view your test results, renew your prescriptions, schedule appointments and more. To sign up, go to [email or web address] . Click on \"Log in\" on the left side of the screen, which will take you to the Welcome page. Then click on \"Sign up Now\" on the right side of the page.     You will be asked to enter the access code listed below, as well as some personal information. Please follow the directions to create your username and password.     Your access code is: [email or web address]  Expires: 2018  4:31 PM     Your access code will  in 90 days. If you need help or a new code, please call your [facility] or [phone].        Care EveryWhere ID     This is your Care " EveryWhere ID. This could be used by other organizations to access your [place] medical records  [id number]        Your Vitals Were     Pulse Temperature Pulse Oximetry             122 99  F (37.2  C) (Oral) 98%          Blood Pressure from Last 3 Encounters:   11/07/17 132/81   07/17/16 108/66   02/19/16 122/66    Weight from Last 3 Encounters:   07/17/16 118 lb (53.5 kg)   02/19/16 127 lb 3.2 oz (57.7 kg)   06/20/13 127 lb (57.6 kg)              We Performed the Following     Comprehensive metabolic panel     EKG 12-lead complete w/read - Clinics     TSH with free T4 reflex        Primary Care Provider Office Phone # Fax #    Chi Jack Gates [phone]       [address] [facility] [address]  [facility] [address]        Equal Access to Services     [facility] HospitalLISA : Hadii aad ku hadasho Soomaali, waaxda luqadaha, qaybta kaalmada adeegyada, josephine albarado hayolive kay . So [facility] [phone].    ATENCIÓN: Si habla español, tiene a gallego disposición servicios gratuitos de asistencia lingüística. Llame al [phone].    We comply with applicable federal civil rights laws and [place] laws. We do not discriminate on the basis of race, color, national origin, age, disability, sex, sexual orientation, or gender identity.            Thank you!     Thank you for choosing [facility]  for your care. Our goal is always to provide you with excellent care. Hearing back from our patients is one way we can continue to improve our services. Please take a few minutes to complete the written survey that you may receive in the mail after your visit with us. Thank you!             Your Updated Medication List - Protect others around you: Learn how to safely use, store and throw away your medicines at www.disposemymeds.org.          This list is accurate as of: 11/7/17  4:31 PM.  Always use your most recent med list.                   Brand Name Dispense Instructions for use Diagnosis     cyclobenzaprine 10 MG tablet    FLEXERIL    90 tablet    TAKE 1 TABLET BY MOUTH EVERY NIGHT AT BEDTIME AS NEEDED FOR MUSCLE SPASMS    Sprain of neck, initial encounter       hydrOXYzine 25 MG capsule    VISTARIL    30 capsule    Take 1 capsule (25 mg) by mouth 3 times daily as needed for itching    Hives       IBUPROFEN PO      Take  by mouth.        KEFLEX PO           MULTIVITAMINS PO      Take  by mouth.        ondansetron 4 MG tablet    ZOFRAN    42 tablet    TAKE 1 TABLET(4 MG) BY MOUTH THREE TIMES DAILY AS NEEDED FOR NAUSEA    Chronic nausea       PROBIOTIC PO      Take  by mouth.        ranitidine 150 MG tablet    ZANTAC     Take 150 mg by mouth 2 times daily        TYLENOL 325 MG tablet   Generic drug:  acetaminophen     100    2 TABLETS EVERY 4 HOURS AS NEEDED

## 2017-11-07 NOTE — PROGRESS NOTES
SUBJECTIVE:   Sade Noyola is a 42 year old female who presents to clinic today for the following health issues:    Rash  Onset: 10/15, has happened 2 more times since     Description:   Location: Face, ears, neck and chest   Character: Hives  Itching (Pruritis): YES- & prickly sensation     Progression of Symptoms:  Episodes are not as intense     Accompanying Signs & Symptoms:  Fever: no   Body aches or joint pain: no changes   Sore throat symptoms: no   Recent cold symptoms: YES    History:   Previous similar rash: YES    Precipitating factors:   Exposure to similar rash: no   New exposures: Happened after taking Claritin    Recent travel: no     Alleviating factors:  Cold air, ice   Under lots of stress  Therapies Tried and outcome: cold air or ice is helpful       Notes occasional ache In chest, no other resp or CV symptoms      Problem list and histories reviewed & adjusted, as indicated.  Additional history: as documented    Labs reviewed in EPIC    Reviewed and updated as needed this visit by clinical staff     Reviewed and updated as needed this visit by Provider         ROS:  Constitutional, HEENT, cardiovascular, pulmonary, gi and gu systems are negative, except as otherwise noted.      OBJECTIVE:   /81  Pulse 122  Temp 99  F (37.2  C) (Oral)  SpO2 98%  There is no height or weight on file to calculate BMI.  GENERAL: alert and fatigued  NECK: no adenopathy, no asymmetry, masses, or scars and thyroid normal to palpation  RESP: lungs clear to auscultation - no rales, rhonchi or wheezes  CV: regular rate and rhythm, normal S1 S2, no S3 or S4, no murmur, click or rub, no peripheral edema and peripheral pulses strong  ABDOMEN: soft, nontender, no hepatosplenomegaly, no masses and bowel sounds normal  MS: normal muscle tone and tenderness to palpation left costosternal margin  SKIN: no suspicious lesions or rashes and wheal/ hives - neck, torso and trunk  NEURO: Normal strength and tone,  mentation intact and speech normal  PSYCH: mentation appears normal, tearful, anxious and judgement and insight intact    Diagnostic Test Results:  EKG - negative    ASSESSMENT/PLAN:             1. Chest wall pain  MS, ice, advil prn  - EKG 12-lead complete w/read - Clinics    2. Hives  Stress related, use cool compresses/ benadrly prn  - TSH with free T4 reflex  - Comprehensive metabolic panel    3. Acute reaction to stress  Urged therapy/ exercise      Regular exercise  See Patient Instructions    Chi Gates MD  [facility] [place] [facility]

## 2017-11-08 LAB
ALBUMIN SERPL-MCNC: 4.2 G/DL (ref 3.4–5)
ALP SERPL-CCNC: 66 U/L (ref 40–150)
ALT SERPL W P-5'-P-CCNC: 29 U/L (ref 0–50)
ANION GAP SERPL CALCULATED.3IONS-SCNC: 10 MMOL/L (ref 3–14)
AST SERPL W P-5'-P-CCNC: 16 U/L (ref 0–45)
BILIRUB SERPL-MCNC: 0.6 MG/DL (ref 0.2–1.3)
BUN SERPL-MCNC: 10 MG/DL (ref 7–30)
CALCIUM SERPL-MCNC: 9.2 MG/DL (ref 8.5–10.1)
CHLORIDE SERPL-SCNC: 102 MMOL/L (ref 94–109)
CO2 SERPL-SCNC: 25 MMOL/L (ref 20–32)
CREAT SERPL-MCNC: 0.66 MG/DL (ref 0.52–1.04)
GFR SERPL CREATININE-BSD FRML MDRD: >90 ML/MIN/1.7M2
GLUCOSE SERPL-MCNC: 101 MG/DL (ref 70–99)
POTASSIUM SERPL-SCNC: 4 MMOL/L (ref 3.4–5.3)
PROT SERPL-MCNC: 7.8 G/DL (ref 6.8–8.8)
SODIUM SERPL-SCNC: 137 MMOL/L (ref 133–144)
TSH SERPL DL<=0.005 MIU/L-ACNC: 1.21 MU/L (ref 0.4–4)

## 2017-11-09 ENCOUNTER — TELEPHONE (OUTPATIENT)
Dept: FAMILY MEDICINE | Facility: CLINIC | Age: 42
End: 2017-11-09

## 2017-11-09 NOTE — TELEPHONE ENCOUNTER
Reason for call:  Results   Name of test or procedure: Comprehensive Metabolic Panel, TSH  Date of test or procedure: 11/7/17  Location of test or procedure: [place]    Additional comments: Pt would like lab results from 11/7/17      Phone number to reach patient:  Home number on file [phone] (home)    Best Time:  Any    Can we leave a detailed message on this number?  YES

## 2017-11-09 NOTE — TELEPHONE ENCOUNTER
"Writer updated patient to normal lab results. 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    No further information to be relayed.   "

## 2017-11-16 DIAGNOSIS — R11.0 CHRONIC NAUSEA: ICD-10-CM

## 2017-11-17 RX ORDER — ONDANSETRON 4 MG/1
TABLET, FILM COATED ORAL
Qty: 42 TABLET | Refills: 0 | OUTPATIENT
Start: 2017-11-17

## 2017-11-20 ENCOUNTER — TRANSFERRED RECORDS (OUTPATIENT)
Dept: HEALTH INFORMATION MANAGEMENT | Facility: CLINIC | Age: 42
End: 2017-11-20

## 2017-11-20 ENCOUNTER — TELEPHONE (OUTPATIENT)
Dept: FAMILY MEDICINE | Facility: CLINIC | Age: 42
End: 2017-11-20

## 2017-11-20 DIAGNOSIS — R11.0 CHRONIC NAUSEA: ICD-10-CM

## 2017-11-20 RX ORDER — ONDANSETRON 4 MG/1
4 TABLET, FILM COATED ORAL EVERY 8 HOURS PRN
Qty: 42 TABLET | Refills: 1 | Status: SHIPPED | OUTPATIENT
Start: 2017-11-20 | End: 2018-01-30

## 2017-11-20 NOTE — TELEPHONE ENCOUNTER
Patient confirms she is still taking Zofran for nausea and made a mistake when she said she was not taking it. Patient requesting a refill of medication. LOV: 11/7/2017    Prescription approved per [facility] Refill Protocol. Patient updated to refill.  Thanks! Marilee Carrillo RN

## 2018-01-02 ENCOUNTER — TELEPHONE (OUTPATIENT)
Dept: FAMILY MEDICINE | Facility: CLINIC | Age: 43
End: 2018-01-02

## 2018-01-02 NOTE — TELEPHONE ENCOUNTER
"Returned call to patient -- she has been sick for 5 days, cough worse. She had a mild fever that has gone away. Pt very anxious throughout conversation saying \"I'm not going to drop dead from a cough, am I?\" Pt stated that she didn't want to come to clinic because she was worried she was going to be diagnosed with hypertension. She wanted to be reassured that hypertension wasn't on her problem list (see OV note from 11/7/17).     Regarding cough she said she \"might be\" short of breath. Coughed all night last night, some pain in ribs and back.     Advised pt that she needs to be evaluated. If she didn't want to come to this clinic, she could go to urgent care or [facility]. She declined an appointment but said she will go to urgent care.    Pt tearful and crying throughout.  Jeni Ambriz, HALLIEN, RN  [facility]                     "

## 2018-01-02 NOTE — TELEPHONE ENCOUNTER
Reason for call:  Symptom   Symptom or request: cough, sore throat    Duration (how long have symptoms been present): 5 days  Have you been treated for this before? No    Additional comments: n/a      Phone number to reach patient:  Home number on file [phone] (home)    Best Time:  n/a    Can we leave a detailed message on this number?  YES

## 2018-01-05 ENCOUNTER — TELEPHONE (OUTPATIENT)
Dept: MIDWIFE SERVICES | Facility: CLINIC | Age: 43
End: 2018-01-05

## 2018-01-05 NOTE — TELEPHONE ENCOUNTER
As you said, any number of things can disrupt her cycle.  Traveling, fighting a cold, stress.  Very normal, continue to monitor.  No need to be seen but if she would prefer to be seen when she comes back for reassurance that is fine too.    Sury

## 2018-01-05 NOTE — TELEPHONE ENCOUNTER
TC to the pt. Pt aware and after a long discussion pt felt reassured and will continue to monitor for now. She will call back if the symptoms do not resolve or worsen. Coral Avila RN

## 2018-01-05 NOTE — TELEPHONE ENCOUNTER
Pt who had her prenatal care with our midwives, but since her last delivery has seen Dr. Gates for her Annual Exams, is calling concerned about an irregular period that she is experiencing this month. Pt states that normally her periods are very regular 26 day cycles. Pt states she started this cycle on 12/24/2017 and had light bleeding x3 days, then spotting times 4 days, then stopped times 2 days and then started spotting again today. The pt was very anxious and concerned, just sure this indicates something terrible. Informed the pt that any number or items can affect her cycle. The pt states she has been sick with a cold/cough for a couple weeks, has been busy with the holidays and has been traveling, currently in [place] and will return on 01/09/2017. Pt states that there is no chance she could be pregnant.  Pt denies menstrual cramping or clotting. Should pt continue to monitor for now? Should she be seen in clinic? Please advise.  Coral Avila RN

## 2018-01-30 DIAGNOSIS — R11.0 CHRONIC NAUSEA: ICD-10-CM

## 2018-01-30 NOTE — TELEPHONE ENCOUNTER
"Requested Prescriptions   Pending Prescriptions Disp Refills     ondansetron (ZOFRAN) 4 MG tablet [Pharmacy Med Name: ONDANSETRON 4MG TABLETS] 42 tablet 0    Last Written Prescription Date:  11/20/17  Last Fill Quantity: 42,  # refills: 1   Last Office Visit with [facility] provider:  11/7/17   Future Office Visit:      Sig: TAKE 1 TABLET(4 MG) BY MOUTH EVERY 8 HOURS AS NEEDED FOR NAUSEA     Antivertigo/Antiemetic Agents Passed    1/30/2018 11:32 AM       Passed - Recent or future visit with authorizing provider's specialty    Patient had office visit in the last year or has a visit in the next 30 days with authorizing provider.  See \"Patient Info\" tab in inbasket, or \"Choose Columns\" in Meds & Orders section of the refill encounter.            Passed - Patient is 18 years of age or older          "

## 2018-01-31 RX ORDER — ONDANSETRON 4 MG/1
TABLET, FILM COATED ORAL
Qty: 42 TABLET | Refills: 0 | Status: SHIPPED | OUTPATIENT
Start: 2018-01-31 | End: 2018-03-12

## 2018-01-31 NOTE — TELEPHONE ENCOUNTER
Prescription approved per [facility] Refill Protocol.    Claribel Velasquez RN   [facility]

## 2018-02-28 ENCOUNTER — TRANSFERRED RECORDS (OUTPATIENT)
Dept: HEALTH INFORMATION MANAGEMENT | Facility: CLINIC | Age: 43
End: 2018-02-28

## 2018-03-12 ENCOUNTER — TELEPHONE (OUTPATIENT)
Dept: FAMILY MEDICINE | Facility: CLINIC | Age: 43
End: 2018-03-12

## 2018-03-12 DIAGNOSIS — R11.0 CHRONIC NAUSEA: ICD-10-CM

## 2018-03-12 RX ORDER — ONDANSETRON 4 MG/1
TABLET, FILM COATED ORAL
Qty: 42 TABLET | Refills: 0 | Status: SHIPPED | OUTPATIENT
Start: 2018-03-12 | End: 2018-04-16

## 2018-03-12 NOTE — TELEPHONE ENCOUNTER
"Requested Prescriptions   Pending Prescriptions Disp Refills     ondansetron (ZOFRAN) 4 MG tablet [Pharmacy Med Name: ONDANSETRON 4MG TABLETS] 42 tablet 0    Last Written Prescription Date:  1/31/18  Last Fill Quantity: 42,  # refills: 0   Last office visit: 11/7/2017 with prescribing provider:  11/7/17   Future Office Visit:     Sig: TAKE 1 TABLET(4 MG) BY MOUTH EVERY 8 HOURS AS NEEDED FOR NAUSEA     Antivertigo/Antiemetic Agents Passed    3/12/2018  8:46 AM       Passed - Recent (12 mo) or future (30 days) visit within the authorizing provider's specialty    Patient had office visit in the last 12 months or has a visit in the next 30 days with authorizing provider or within the authorizing provider's specialty.  See \"Patient Info\" tab in inbasket, or \"Choose Columns\" in Meds & Orders section of the refill encounter.           Passed - Patient is 18 years of age or older          "

## 2018-03-12 NOTE — TELEPHONE ENCOUNTER
Reason for call:  Other   Patient called regarding (reason for call): call back  Additional comments: The patient called and wanted to talk to Dr. Gates about questions she has about her monthly cycle. She would like a call back to discuss the questions that she has.    Phone number to reach patient:  Home number on file [phone] (home)    Best Time:  Any    Can we leave a detailed message on this number?  YES

## 2018-03-12 NOTE — TELEPHONE ENCOUNTER
"Called patient. Patient states that she has had very irrregular menstrual cycles over the past several months starting in January. Per patient, she has historically always had very regular periods. She had her last \"regular\" period the end of December around the 24th that lasted for 5-7 days. Following that menses, she began spotting on and off through the beginning of January and then again over the first week of February she spotted once.   Patient reports that she had a period of spotting everyday for about 5 days during the week of valentine's day. Patient states that she has had a lot of increased stress in her life over the past several months including family issues, marriage issues, traveling, and a very busy schedule with her children.    Reassured the patient that the episodes that she is experiencing are likely due to the increased stressors in her life. Pt asked if she is beginning menopause, writer informed patient that although she is fairly young, it isn't necessarily out of the question, but she should discuss these issues further with a provider. Patient reports that she has all of the normal symptoms she would normally have with a PMS right now such as muscle aching/cramps, moodiness/felling emotional, constipation, and feeling warm in the morning when waking up.     Writer recommended to the patient to see how she does over the next few weeks, and if she does not experience a normal cycle, then schedule an appointment to see her PCP. Pt verbalized understanding.    Breanna Mills RN  [facility]  "

## 2018-03-12 NOTE — TELEPHONE ENCOUNTER
Prescription approved per [facility] Refill Protocol.    Lauren Harvey, BSN RN  [facility]

## 2018-04-16 DIAGNOSIS — R11.0 CHRONIC NAUSEA: ICD-10-CM

## 2018-04-16 DIAGNOSIS — S13.9XXA SPRAIN OF NECK, INITIAL ENCOUNTER: ICD-10-CM

## 2018-04-16 RX ORDER — CYCLOBENZAPRINE HCL 10 MG
TABLET ORAL
Qty: 30 TABLET | Refills: 1 | Status: SHIPPED | OUTPATIENT
Start: 2018-04-16 | End: 2018-07-25

## 2018-04-16 RX ORDER — ONDANSETRON 4 MG/1
TABLET, FILM COATED ORAL
Qty: 42 TABLET | Refills: 3 | Status: SHIPPED | OUTPATIENT
Start: 2018-04-16 | End: 2018-09-15

## 2018-04-16 NOTE — TELEPHONE ENCOUNTER
Dr. Gates--    Patient requesting refill of cyclobenzaprine 10 mg tablets (last prescribed #90 with 1 refill on 10/23/17) and Ondansetron 4 mg tablets (last refilled 3/12/18).     Medications and pharmacy cued for your review.     Lauren Harvey RN

## 2018-05-10 ENCOUNTER — TRANSFERRED RECORDS (OUTPATIENT)
Dept: HEALTH INFORMATION MANAGEMENT | Facility: CLINIC | Age: 43
End: 2018-05-10

## 2018-05-18 ENCOUNTER — TELEPHONE (OUTPATIENT)
Dept: FAMILY MEDICINE | Facility: CLINIC | Age: 43
End: 2018-05-18

## 2018-05-18 NOTE — TELEPHONE ENCOUNTER
Pt states she is requesting medical advice for sleep issues with a medication, pt refused to provide more information.  Pt can be reached @ [phone] daisy

## 2018-05-18 NOTE — TELEPHONE ENCOUNTER
Left message for pt to return call to clinic    Claribel Velasquez RN   [facility]

## 2018-05-18 NOTE — TELEPHONE ENCOUNTER
Pt has a very rare thing that happens when she is sleeping every once in a while she will gasp in her sleep, she doesn't feel SOB, asked if her partner was noticing if she was snoring and then having times where she is not breathing then will gasp, is she waking up tired even after she has slept for the night. She has asked her partner and her kids if she snores, she was told maybe once every couple of weeks and if nudged she will stop snoring and go back to sleep.    Discussed with her that we go into different levels of sleep. She wakes up in the am feeling refreshed and not tired     She will call id any questions/concerns    Claribel Velasquez RN   [facility]

## 2018-06-14 ENCOUNTER — TELEPHONE (OUTPATIENT)
Dept: FAMILY MEDICINE | Facility: CLINIC | Age: 43
End: 2018-06-14

## 2018-06-14 NOTE — TELEPHONE ENCOUNTER
patient had elevated BP in ED   she is very worried that if she checks it her anxiety will cause elevated BP  I reviewed her low risk of CAD   she will check BP at home and call me

## 2018-06-14 NOTE — TELEPHONE ENCOUNTER
Sade is asking to speak to Dr. Gates at some point as [place] and had some questions to ask. She can be reached at [phone].

## 2018-06-14 NOTE — TELEPHONE ENCOUNTER
Dr. Gates--    Patient would like you to call her when you are able.    Best number to reach her at is: [phone]    Thank you,   Lauren Harvey RN

## 2018-07-25 DIAGNOSIS — S13.9XXA SPRAIN OF NECK, INITIAL ENCOUNTER: ICD-10-CM

## 2018-07-25 NOTE — TELEPHONE ENCOUNTER
cyclobenzaprine (FLEXERIL) 10 MG tablet  Last Written Prescription Date:  04/16/2018  Last Fill Quantity: 30,   # refills: 1  Last Office Visit: 11/07/2017  Future Office visit:       Routing refill request to provider for review/approval because:  Drug not on the [facility], [facility] or [facility] refill protocol or controlled substance

## 2018-07-27 ENCOUNTER — SURGERY - HEALTHEAST (OUTPATIENT)
Dept: SURGERY | Facility: CLINIC | Age: 43
End: 2018-07-27

## 2018-07-27 ENCOUNTER — ANESTHESIA - HEALTHEAST (OUTPATIENT)
Dept: SURGERY | Facility: CLINIC | Age: 43
End: 2018-07-27

## 2018-07-27 ENCOUNTER — TRANSFERRED RECORDS (OUTPATIENT)
Dept: HEALTH INFORMATION MANAGEMENT | Facility: CLINIC | Age: 43
End: 2018-07-27

## 2018-07-27 RX ORDER — CYCLOBENZAPRINE HCL 10 MG
TABLET ORAL
Qty: 30 TABLET | Refills: 0 | Status: SHIPPED | OUTPATIENT
Start: 2018-07-27 | End: 2020-02-27

## 2018-07-27 NOTE — TELEPHONE ENCOUNTER
Routing refill request to provider for review/approval because:  Drug not on the [facility] refill protocol     Claribel Velasquez RN   [facility]

## 2018-07-30 ENCOUNTER — COMMUNICATION - HEALTHEAST (OUTPATIENT)
Dept: UROLOGY | Facility: CLINIC | Age: 43
End: 2018-07-30

## 2018-07-30 DIAGNOSIS — N20.9 URINARY TRACT STONES: ICD-10-CM

## 2018-07-31 ENCOUNTER — OFFICE VISIT - HEALTHEAST (OUTPATIENT)
Dept: UROLOGY | Facility: CLINIC | Age: 43
End: 2018-07-31

## 2018-07-31 ENCOUNTER — SURGERY - HEALTHEAST (OUTPATIENT)
Dept: SURGERY | Facility: CLINIC | Age: 43
End: 2018-07-31

## 2018-07-31 ENCOUNTER — TRANSFERRED RECORDS (OUTPATIENT)
Dept: HEALTH INFORMATION MANAGEMENT | Facility: CLINIC | Age: 43
End: 2018-07-31

## 2018-07-31 ENCOUNTER — ANESTHESIA - HEALTHEAST (OUTPATIENT)
Dept: SURGERY | Facility: CLINIC | Age: 43
End: 2018-07-31

## 2018-07-31 LAB
CREAT SERPL-MCNC: 0.83 MG/DL (ref 0.6–1.1)
GFR SERPL CREATININE-BSD FRML MDRD: >60 ML/MIN/1.73M2
GLUCOSE SERPL-MCNC: 101 MG/DL (ref 70–125)
POTASSIUM SERPL-SCNC: 4 MMOL/L (ref 3.5–5)

## 2018-07-31 ASSESSMENT — MIFFLIN-ST. JEOR: SCORE: 1139.36

## 2018-08-01 ENCOUNTER — COMMUNICATION - HEALTHEAST (OUTPATIENT)
Dept: UROLOGY | Facility: CLINIC | Age: 43
End: 2018-08-01

## 2018-08-01 ENCOUNTER — COMMUNICATION - HEALTHEAST (OUTPATIENT)
Dept: SCHEDULING | Facility: CLINIC | Age: 43
End: 2018-08-01

## 2018-08-02 ENCOUNTER — AMBULATORY - HEALTHEAST (OUTPATIENT)
Dept: UROLOGY | Facility: CLINIC | Age: 43
End: 2018-08-02

## 2018-08-02 ENCOUNTER — COMMUNICATION - HEALTHEAST (OUTPATIENT)
Dept: UROLOGY | Facility: CLINIC | Age: 43
End: 2018-08-02

## 2018-08-02 DIAGNOSIS — N20.2 CALCULUS OF KIDNEY WITH CALCULUS OF URETER: ICD-10-CM

## 2018-08-04 ENCOUNTER — COMMUNICATION - HEALTHEAST (OUTPATIENT)
Dept: SCHEDULING | Facility: CLINIC | Age: 43
End: 2018-08-04

## 2018-08-06 ENCOUNTER — TELEPHONE (OUTPATIENT)
Dept: FAMILY MEDICINE | Facility: CLINIC | Age: 43
End: 2018-08-06

## 2018-08-06 ENCOUNTER — COMMUNICATION - HEALTHEAST (OUTPATIENT)
Dept: SCHEDULING | Facility: CLINIC | Age: 43
End: 2018-08-06

## 2018-08-06 ENCOUNTER — AMBULATORY - HEALTHEAST (OUTPATIENT)
Dept: UROLOGY | Facility: CLINIC | Age: 43
End: 2018-08-06

## 2018-08-06 DIAGNOSIS — N20.9 URINARY TRACT STONES: ICD-10-CM

## 2018-08-06 DIAGNOSIS — N20.1 CALCULUS OF URETER: ICD-10-CM

## 2018-08-06 LAB
ALBUMIN UR-MCNC: ABNORMAL MG/DL
APPEARANCE UR: ABNORMAL
BILIRUB UR QL STRIP: NEGATIVE
COLOR UR AUTO: ABNORMAL
GLUCOSE UR STRIP-MCNC: NEGATIVE MG/DL
HGB UR QL STRIP: ABNORMAL
KETONES UR STRIP-MCNC: NEGATIVE MG/DL
LEUKOCYTE ESTERASE UR QL STRIP: ABNORMAL
NITRATE UR QL: NEGATIVE
PH UR STRIP: 6.5 [PH] (ref 5–8)
SP GR UR STRIP: 1.02 (ref 1–1.03)
UROBILINOGEN UR STRIP-ACNC: ABNORMAL

## 2018-08-06 NOTE — TELEPHONE ENCOUNTER
Panel Management Review      Patient has the following on her problem list: None      Composite cancer screening  Chart review shows that this patient is due/due soon for the following Pap Smear  Summary:    Patient is due/failing the following:   LDL and PAP    Action needed:   Patient needs office visit for physical with pap and fasting blood work.    Type of outreach:    Sent letter.    Questions for provider review:    None                                                                                                                                    Halley Walker MA

## 2018-08-06 NOTE — LETTER
August 6, 2018      Sade Noyola  [address] [place] [id number]        Dear Sade    In order to ensure we are providing the best quality care, we have reviewed your chart and see that you are due for:    1. Physical with pap and fasting blood work    Please call the clinic at your earliest convenience to schedule an appointment.  If you have completed these please contact our office via phone at [phone] or [facility] to update our records.  We would like to know the date (approximately month and year), the result, and ideally where the procedure was performed.    Thank you for trusting us with your health care.      Sincerely,    Care Team for Chi Gates MD

## 2018-08-08 ENCOUNTER — COMMUNICATION - HEALTHEAST (OUTPATIENT)
Dept: UROLOGY | Facility: CLINIC | Age: 43
End: 2018-08-08

## 2018-08-08 DIAGNOSIS — N39.0 UTI (URINARY TRACT INFECTION): ICD-10-CM

## 2018-08-08 LAB — BACTERIA SPEC CULT: ABNORMAL

## 2018-08-09 ENCOUNTER — COMMUNICATION - HEALTHEAST (OUTPATIENT)
Dept: UROLOGY | Facility: CLINIC | Age: 43
End: 2018-08-09

## 2018-08-09 DIAGNOSIS — N20.1 URETERAL CALCULUS, LEFT: ICD-10-CM

## 2018-08-09 DIAGNOSIS — N13.30 HYDRONEPHROSIS OF LEFT KIDNEY: ICD-10-CM

## 2018-08-09 DIAGNOSIS — N20.1 URETERAL STONE: ICD-10-CM

## 2018-08-10 ENCOUNTER — TELEPHONE (OUTPATIENT)
Dept: FAMILY MEDICINE | Facility: CLINIC | Age: 43
End: 2018-08-10

## 2018-08-10 ENCOUNTER — AMBULATORY - HEALTHEAST (OUTPATIENT)
Dept: UROLOGY | Facility: CLINIC | Age: 43
End: 2018-08-10

## 2018-08-10 DIAGNOSIS — R10.9 ABDOMINAL CRAMPING: ICD-10-CM

## 2018-08-10 NOTE — TELEPHONE ENCOUNTER
Patient is requesting a call back. Patient would like a refill of a previous medication used for severe stomach cramping and diarrhea:     diphenoxylate-atropine (LOMOTIL) 2.5-0.025 MG per tablet     Please confirm this is the correct medication to refill.

## 2018-08-10 NOTE — TELEPHONE ENCOUNTER
Called patient. Pt had kidney stone/infection about a few weeks ago. Had stent that didn't take and had to get a new one placed. She is now on an antibiotic and started having diarrhea. She spoke with the office/doctor who prescribed the antibiotic (her urologist) and asked about her symptoms. They stated that diarrhea is a normal side effect for this medication and told patient that they would send a prescription for lomotil. Pt is waiting to hear back from them. Advised pt to wait until she hears from them. In the meantime stay very well hydrated. Take probiotics, yogurt, kombucha, and try OTC anti-diarrheal, loperamide or imodium. Discussed emergent s/s of dehydration that patient needs to monitor. Pt verbalized understanding of concern for dehydration and when she would need to go to ER. Discussed that if diarrhea continues to persist after stopping medication, might want to consider being seen in clinic for stool sample. Pt verbalized understanding.    Breanna Mills RN  [facility]

## 2018-08-14 ENCOUNTER — COMMUNICATION - HEALTHEAST (OUTPATIENT)
Dept: UROLOGY | Facility: CLINIC | Age: 43
End: 2018-08-14

## 2018-08-16 ENCOUNTER — NURSE TRIAGE (OUTPATIENT)
Dept: NURSING | Facility: CLINIC | Age: 43
End: 2018-08-16

## 2018-08-16 ENCOUNTER — COMMUNICATION - HEALTHEAST (OUTPATIENT)
Dept: UROLOGY | Facility: CLINIC | Age: 43
End: 2018-08-16

## 2018-08-17 NOTE — TELEPHONE ENCOUNTER
Patient says she was seen for kidney stone removal/UTI and was given Macrobid.  Patient reported she started the antibiotic last Thurs and her last dose was Tuesday morning.  Patient says she had a bout of diarrhea this evening and is concerned she may have C diff.  FNA reviewed guideline and advised of care advice.  Patient verbalizes understanding.            Reason for Disposition    [1] Recent antibiotic therapy (i.e., within last 2 months) AND [2] > 3 days since antibiotic was stopped    Additional Information    Negative: Shock suspected (e.g., cold/pale/clammy skin, too weak to stand, low BP, rapid pulse)    Negative: Difficult to awaken or acting confused  (e.g., disoriented, slurred speech)    Negative: Sounds like a life-threatening emergency to the triager    Negative: Vomiting also present and worse than the diarrhea    Negative: [1] Blood in stool AND [2] without diarrhea    Negative: [1] SEVERE abdominal pain (e.g., excruciating) AND [2] present > 1 hour    Negative: [1] SEVERE abdominal pain AND [2] age > 60    Negative: [1] Blood in the stool AND [2] moderate or large amount of blood    Negative: Black or tarry bowel movements  (Exception: chronic-unchanged  black-grey bowel movements AND is taking iron pills or Pepto-bismol)    Negative: [1] Drinking very little AND [2] dehydration suspected (e.g., no urine > 12 hours, very dry mouth, very lightheaded)    Negative: Patient sounds very sick or weak to the triager    Negative: [1] SEVERE diarrhea (e.g., 7 or more times / day more than normal) AND [2]  age > 60 years    Negative: [1] Constant abdominal pain AND [2] present > 2 hours    Negative: [1] Fever > 103 F (39.4 C) AND [2] not able to get the fever down using Fever Care Advice    Negative: [1] SEVERE diarrhea (e.g., 7 or more times / day more than normal) AND [2] present > 24 hours (1 day)    Negative: [1] MODERATE diarrhea (e.g., 4-6 times / day more than normal) AND [2] present > 48 hours (2  days)    Negative: [1] MODERATE diarrhea (e.g., 4-6 times / day more than normal) AND [2] age > 70 years    Negative: Fever > 101 F (38.3 C)    Negative: Fever present > 3 days (72 hours)    Negative: Abdominal pain  (Exception: Pain clears with each passage of diarrhea stool)    Negative: [1] Blood in the stool AND [2] small amount of blood   (Exception: only on toilet paper. Reason: diarrhea can cause rectal irritation with blood on wiping)    Negative: [1] Mucus or pus in stool AND [2] present > 2 days AND [3] diarrhea is more than mild    Protocols used: DIARRHEA-ADULT-

## 2018-09-10 ENCOUNTER — AMBULATORY - HEALTHEAST (OUTPATIENT)
Dept: UROLOGY | Facility: CLINIC | Age: 43
End: 2018-09-10

## 2018-09-10 DIAGNOSIS — N20.9 URINARY CALCULUS: ICD-10-CM

## 2018-09-10 DIAGNOSIS — N20.1 CALCULUS OF URETER: ICD-10-CM

## 2018-09-10 DIAGNOSIS — N20.0 CALCULUS OF KIDNEY: ICD-10-CM

## 2018-09-13 DIAGNOSIS — S13.9XXA SPRAIN OF NECK, INITIAL ENCOUNTER: ICD-10-CM

## 2018-09-14 RX ORDER — CYCLOBENZAPRINE HCL 10 MG
TABLET ORAL
Qty: 30 TABLET | Refills: 0 | Status: SHIPPED | OUTPATIENT
Start: 2018-09-14 | End: 2018-10-17

## 2018-09-14 NOTE — TELEPHONE ENCOUNTER
Cyclobenzaprine last written on 7/27/18 for #30  Refill request to MD/primary care provider.  Barbi Stauffer RN

## 2018-09-15 DIAGNOSIS — R11.0 CHRONIC NAUSEA: ICD-10-CM

## 2018-09-17 RX ORDER — ONDANSETRON 4 MG/1
TABLET, FILM COATED ORAL
Qty: 42 TABLET | Refills: 0 | Status: SHIPPED | OUTPATIENT
Start: 2018-09-17 | End: 2018-12-07

## 2018-09-17 NOTE — TELEPHONE ENCOUNTER
Prescription approved per [facility] Refill Protocol.    Lauren Harvey, BSN RN  [facility]

## 2018-09-17 NOTE — TELEPHONE ENCOUNTER
"Requested Prescriptions   Pending Prescriptions Disp Refills     ondansetron (ZOFRAN) 4 MG tablet [Pharmacy Med Name: ONDANSETRON 4MG TABLETS]    Last Written Prescription Date:  4-16-18/  Last Fill Quantity: 42,  # refills: 3   Last office visit: 11/7/2017 with prescribing provider:  Dr. Gates   Future Office Visit:     42 tablet 0     Sig: TAKE 1 TABLET(4 MG) BY MOUTH EVERY 8 HOURS AS NEEDED FOR NAUSEA     Antivertigo/Antiemetic Agents Passed    9/15/2018  2:24 PM       Passed - Recent (12 mo) or future (30 days) visit within the authorizing provider's specialty    Patient had office visit in the last 12 months or has a visit in the next 30 days with authorizing provider or within the authorizing provider's specialty.  See \"Patient Info\" tab in inbasket, or \"Choose Columns\" in Meds & Orders section of the refill encounter.           Passed - Patient is 18 years of age or older          "

## 2018-09-25 ENCOUNTER — AMBULATORY - HEALTHEAST (OUTPATIENT)
Dept: LAB | Facility: HOSPITAL | Age: 43
End: 2018-09-25

## 2018-09-25 DIAGNOSIS — N20.0 CALCULUS OF KIDNEY: ICD-10-CM

## 2018-09-25 LAB
CALCIUM 24H UR-MRATE: 215 MG/24HR (ref 20–275)
CHLORIDE 24H UR-SRATE: ABNORMAL MMOL/24HR (ref 110–250)
CITRATE 24H UR-MCNC: 613 MG/24HR (ref 328–1191)
CREATININE, 24 HR URINE - HISTORICAL: 1000.1 MG/24HR (ref 800–1800)
MAGNESIUM 24H UR-MRATE: ABNORMAL MG/24 HR (ref 75–150)
OXALATE MG/SPEC: 33.1 MG/24HR (ref 7–44)
PH UR STRIP: 6.5 [PH] (ref 4.5–8)
PHOSPHORUS URINE MG/SPEC: 469 MG/24HR (ref 400–1300)
POTASSIUM 24H UR-SCNC: 24 MMOL/24HR (ref 30–90)
SODIUM 24H UR-SRATE: 85 MMOL/24HR (ref 40–217)
SPECIMEN VOL UR: 2825 ML
URIC ACID URINE MG/SPEC: 455 MG/24HR (ref 250–750)

## 2018-10-12 ENCOUNTER — TELEPHONE (OUTPATIENT)
Dept: FAMILY MEDICINE | Facility: CLINIC | Age: 43
End: 2018-10-12

## 2018-10-12 NOTE — TELEPHONE ENCOUNTER
Panel Management Review      Patient has the following on her problem list: None      Composite cancer screening  Chart review shows that this patient is due/due soon for the following Pap Smear  Summary:    Patient is due/failing the following:   LDL and PAP    Action needed:   Patient needs office visit for physical with fasting labs and pap.    Type of outreach:    Sent letter.    Questions for provider review:    None                                                                                                                                    Halley Walker MA

## 2018-10-12 NOTE — LETTER
October 12, 2018      Sade Noyola  [address] [place] [phone]        Dear Sade,    I care about your health and have reviewed your health plan. I have reviewed your chart and see you are in particular need of attention regarding:  -Cervical Cancer Screening    I am recommending that you:  -schedule a PAP SMEAR EXAM which is due.  Please disregard this reminder if you have had this exam elsewhere within the last year.  It would be helpful for us to have a copy of your recent pap smear report in our file so that we can best coordinate your care.  -get your lipid blood work done as well- which is fasting (not eating) for at least 8 hours before your appointment. You are allowed to take any medications the morning of your appointment and can have water as well.    Here is a list of Health Maintenance topics that are due now or due soon:  Health Maintenance Due   Topic Date Due     PAP Q3 YR  01/18/2009     PHQ-2 Q1 YR  03/30/2018     LIPID SCREEN Q5 YR FEMALE (SYSTEM ASSIGNED)  05/16/2018     INFLUENZA VACCINE (1) 09/01/2018     TETANUS IMMUNIZATION (SYSTEM ASSIGNED)  10/20/2018       Please call us at [phone] (or use [employer]) to address the above recommendations.     Thank you for trusting [facility] and we appreciate the opportunity to serve you.  We look forward to supporting your healthcare needs in the future.    Healthy Regards,       Care team for Chi Gates MD

## 2018-10-16 ENCOUNTER — AMBULATORY - HEALTHEAST (OUTPATIENT)
Dept: LAB | Facility: HOSPITAL | Age: 43
End: 2018-10-16

## 2018-10-16 DIAGNOSIS — N20.0 CALCULUS OF KIDNEY: ICD-10-CM

## 2018-10-16 LAB
CALCIUM 24H UR-MRATE: 265 MG/24HR (ref 20–275)
CALCIUM SERPL-MCNC: 10 MG/DL (ref 8.5–10.5)
CALCIUM, IONIZED MEASURED: 1.2 MMOL/L (ref 1.11–1.3)
CHLORIDE 24H UR-SRATE: 93 MMOL/24HR (ref 110–250)
CITRATE 24H UR-MCNC: 605 MG/24HR (ref 328–1191)
CREAT SERPL-MCNC: 0.77 MG/DL (ref 0.6–1.1)
CREATININE, 24 HR URINE - HISTORICAL: 1042.5 MG/24HR (ref 800–1800)
GFR SERPL CREATININE-BSD FRML MDRD: >60 ML/MIN/1.73M2
ION CA PH 7.4: 1.2 MMOL/L (ref 1.11–1.3)
MAGNESIUM 24H UR-MRATE: 90 MG/24 HR (ref 75–150)
OXALATE MG/SPEC: 30.8 MG/24HR (ref 7–44)
PH UR STRIP: 6.5 [PH] (ref 4.5–8)
PH: 7.39 (ref 7.35–7.45)
PHOSPHATE SERPL-MCNC: 2.9 MG/DL (ref 2.5–4.5)
PHOSPHORUS URINE MG/SPEC: 640 MG/24HR (ref 400–1300)
POTASSIUM 24H UR-SCNC: 40 MMOL/24HR (ref 30–90)
PTH-INTACT SERPL-MCNC: 77 PG/ML (ref 10–86)
SODIUM 24H UR-SRATE: 70 MMOL/24HR (ref 40–217)
SPECIMEN VOL UR: 2500 ML
URIC ACID URINE MG/SPEC: 488 MG/24HR (ref 250–750)

## 2018-10-17 DIAGNOSIS — S13.9XXA SPRAIN OF NECK, INITIAL ENCOUNTER: ICD-10-CM

## 2018-10-18 RX ORDER — CYCLOBENZAPRINE HCL 10 MG
TABLET ORAL
Qty: 30 TABLET | Refills: 1 | Status: SHIPPED | OUTPATIENT
Start: 2018-10-18 | End: 2018-12-07

## 2018-10-18 NOTE — TELEPHONE ENCOUNTER
The patient is asking that Dr. Gates put one extra refill on this medication so she doesn't have to call the clinic every 20 days. She clenches her teeth when she sleeps and she is also being treated by a dentist and a chiropractor for this issue. She stated she only has two pills left.

## 2018-10-18 NOTE — TELEPHONE ENCOUNTER
Dr. Gates,    Please see phone message below.     Please review/sign or advise for refill of flexeril 10 mg tablet. Last prescribed 09/14/18 for #30.    Cued for quantity 30 with 1 refill if okay. Pt's LOV was 11/07/17, no upcoming appt's scheduled.     Breanna Mills RN  [facility]

## 2018-10-29 ENCOUNTER — OFFICE VISIT - HEALTHEAST (OUTPATIENT)
Dept: UROLOGY | Facility: CLINIC | Age: 43
End: 2018-10-29

## 2018-10-29 DIAGNOSIS — N20.9 URINARY CALCULUS: ICD-10-CM

## 2018-12-07 DIAGNOSIS — S13.9XXA SPRAIN OF NECK, INITIAL ENCOUNTER: ICD-10-CM

## 2018-12-07 DIAGNOSIS — R11.0 CHRONIC NAUSEA: ICD-10-CM

## 2018-12-07 RX ORDER — CYCLOBENZAPRINE HCL 10 MG
TABLET ORAL
Qty: 30 TABLET | Refills: 0 | Status: SHIPPED | OUTPATIENT
Start: 2018-12-07 | End: 2021-06-14

## 2018-12-07 RX ORDER — ONDANSETRON 4 MG/1
TABLET, FILM COATED ORAL
Qty: 42 TABLET | Refills: 0 | Status: SHIPPED | OUTPATIENT
Start: 2018-12-07 | End: 2019-02-19

## 2018-12-07 NOTE — TELEPHONE ENCOUNTER
Called patient. LVM to call clinic. Pt is due for OV with PCP. LOV in clinic was 11/07/17.    Breanna Mills RN  [facility]

## 2018-12-07 NOTE — TELEPHONE ENCOUNTER
"Requested Prescriptions   Pending Prescriptions Disp Refills     ondansetron (ZOFRAN) 4 MG tablet [Pharmacy Med Name: ONDANSETRON 4MG TABLETS] 42 tablet 0     Sig: TAKE 1 TABLET(4 MG) BY MOUTH EVERY 8 HOURS AS NEEDED FOR NAUSEA     Antivertigo/Antiemetic Agents Failed    12/7/2018  9:54 AM       Failed - Recent (12 mo) or future (30 days) visit within the authorizing provider's specialty    Patient had office visit in the last 12 months or has a visit in the next 30 days with authorizing provider or within the authorizing provider's specialty.  See \"Patient Info\" tab in inbasket, or \"Choose Columns\" in Meds & Orders section of the refill encounter.             Passed - Patient is 18 years of age or older        cyclobenzaprine (FLEXERIL) 10 MG tablet [Pharmacy Med Name: CYCLOBENZAPRINE 10MG TABLETS] 30 tablet 0     Sig: TAKE 1 TABLET BY MOUTH EVERY NIGHT AT BEDTIME AS NEEDED FOR MUSCLE SPASMS    There is no refill protocol information for this order        "

## 2018-12-07 NOTE — TELEPHONE ENCOUNTER
Called patient. Left non-detailed message on self identified voicemail that requested prescriptions available at preferred pharmacy. Instructed patient to return call to clinic if questions    Izzy Romero RN  Triage Nurse

## 2018-12-07 NOTE — TELEPHONE ENCOUNTER
Dr. Gates,     Please review/sign or advise for refill request of ondansetron (ZOFRAN) 4 MG tablet and cyclobenzaprine (FLEXERIL) 10 MG tablet    Routing refill request to provider for review/approval because:  Patient needs to be seen because it has been more than 1 year since last office visit.    LOV:11/07/2017  Last Scripts:   ~ Flexeril- 10 mg tablet   1). 10/18/2018 #30/1 refill prescribed by Dr. Gates  2). 07/27/2018 #30/0 refills prescribed by Dr. Gates    Spoke with patient. Patient totally out of medication.  Informed patient she will need to schedule an office visit.    Patient states her  has recently started a new job and will be changing insurances. Patient unable to come in before January.     Appointment scheduled on 1/25/2018.     Please advise    Thank You!  Izzy Romero, RN  Triage Nurse

## 2019-02-19 ENCOUNTER — OFFICE VISIT (OUTPATIENT)
Dept: FAMILY MEDICINE | Facility: CLINIC | Age: 44
End: 2019-02-19
Payer: COMMERCIAL

## 2019-02-19 VITALS
WEIGHT: 128.6 LBS | OXYGEN SATURATION: 99 % | HEART RATE: 128 BPM | TEMPERATURE: 99 F | DIASTOLIC BLOOD PRESSURE: 84 MMHG | SYSTOLIC BLOOD PRESSURE: 135 MMHG | BODY MASS INDEX: 22.78 KG/M2

## 2019-02-19 DIAGNOSIS — A08.4 VIRAL GASTROENTERITIS: Primary | ICD-10-CM

## 2019-02-19 DIAGNOSIS — R03.0 ELEVATED BP WITHOUT DIAGNOSIS OF HYPERTENSION: ICD-10-CM

## 2019-02-19 DIAGNOSIS — R11.0 CHRONIC NAUSEA: ICD-10-CM

## 2019-02-19 DIAGNOSIS — S13.9XXA SPRAIN OF NECK, INITIAL ENCOUNTER: ICD-10-CM

## 2019-02-19 DIAGNOSIS — F43.22 ADJUSTMENT DISORDER WITH ANXIETY: ICD-10-CM

## 2019-02-19 PROCEDURE — 99214 OFFICE O/P EST MOD 30 MIN: CPT | Performed by: FAMILY MEDICINE

## 2019-02-19 RX ORDER — CYCLOBENZAPRINE HCL 10 MG
TABLET ORAL
Qty: 90 TABLET | Refills: 3 | Status: SHIPPED | OUTPATIENT
Start: 2019-02-19 | End: 2020-03-26

## 2019-02-19 RX ORDER — ONDANSETRON 4 MG/1
TABLET, FILM COATED ORAL
Qty: 60 TABLET | Refills: 3 | Status: SHIPPED | OUTPATIENT
Start: 2019-02-19 | End: 2019-08-30

## 2019-02-19 NOTE — PROGRESS NOTES
SUBJECTIVE:   Sade Noyola is a 43 year old female who presents to clinic today for the following health issues:    ABDOMINAL   PAIN     Onset: 2 days     Description:   Character: no pain  Location: epigastric region  Radiation: None and Pelvic region    Intensity: mild, pressure     Progression of Symptoms:  intermittent    Accompanying Signs & Symptoms:  Fever/Chills?: no   Gas/Bloating: YES  Nausea: no   Vomitting: no   Diarrhea?: YES  Constipation:no   Dysuria or Hematuria: no    History:   Trauma: no   Previous similar pain: no    Previous tests done: none    Precipitating factors:   Does the pain change with:     Food: no      BM: no     Urination: no     Alleviating factors:      Therapies Tried and outcome:medication    LMP:  Not sure        Anxiety Follow-Up    Status since last visit: Improved over time with exercise, weight loss, dealing with kidnet stones        Other associated symptoms: worries about possible HTN    Complicating factors:   Significant life event: Yes-  Kidney stones   Current substance abuse: None  Depression symptoms: No  No flowsheet data found.    MAYE-7  Encounter Diagnoses   Name Primary?     Sprain of neck, initial encounter, takes flexeril for neck pain/ insomnia, woirks well, no side effect      Chronic nausea, responds to as needed zofran      Viral gastroenteritis Yes     Adjustment disorder with anxiety         Problem list and histories reviewed & adjusted, as indicated.  Additional history: as documented         Reviewed and updated as needed this visit by clinical staff       Reviewed and updated as needed this visit by Provider         ROS:  Constitutional, HEENT, cardiovascular, pulmonary, gi and gu systems are negative, except as otherwise noted.    OBJECTIVE:     /84   Pulse 128   Temp 99  F (37.2  C) (Oral)   Wt 58.3 kg (128 lb 9.6 oz)   SpO2 99%   BMI 22.78 kg/m    Body mass index is 22.78 kg/m .  GENERAL: alert and fatigued  RESP: lungs clear to  auscultation - no rales, rhonchi or wheezes  CV: regular rate and rhythm, normal S1 S2, no S3 or S4, no murmur, click or rub, no peripheral edema and peripheral pulses strong  PSYCH: mentation appears normal, tearful, anxious and judgement and insight intact    Diagnostic Test Results:  none     ASSESSMENT/PLAN:             1. Sprain of neck, initial encounter  Ok refill, work on exercise/ sleep hygeaine  - cyclobenzaprine (FLEXERIL) 10 MG tablet; TAKE 1 TABLET BY MOUTH EVERY NIGHT AT BEDTIME AS NEEDED FOR MUSCLE SPASMS  Dispense: 90 tablet; Refill: 3    2. Chronic nausea  Ok prn  - ondansetron (ZOFRAN) 4 MG tablet; TAKE 1 TABLET(4 MG) BY MOUTH EVERY 8 HOURS AS NEEDED FOR NAUSEA  Dispense: 60 tablet; Refill: 3    3. Viral gastroenteritis  Rest, fluids    4. Adjustment disorder with anxiety  Better overall, seeing therapist  5. Elevated BP without diagnosis of HTN   better today after visit, gets white coat elevated BP  Follow up in 1 year.   Regular exercise  See Patient Instructions    Chi Gates MD  [facility]

## 2019-03-05 ENCOUNTER — OFFICE VISIT (OUTPATIENT)
Dept: FAMILY MEDICINE | Facility: CLINIC | Age: 44
End: 2019-03-05
Payer: COMMERCIAL

## 2019-03-05 DIAGNOSIS — Z23 NEED FOR PROPHYLACTIC VACCINATION WITH TETANUS-DIPHTHERIA (TD): ICD-10-CM

## 2019-03-05 DIAGNOSIS — L30.1 DYSHIDROTIC ECZEMA: ICD-10-CM

## 2019-03-05 DIAGNOSIS — S93.501A SPRAIN OF RIGHT GREAT TOE, INITIAL ENCOUNTER: Primary | ICD-10-CM

## 2019-03-05 PROCEDURE — 99214 OFFICE O/P EST MOD 30 MIN: CPT | Performed by: FAMILY MEDICINE

## 2019-03-05 PROCEDURE — 90715 TDAP VACCINE 7 YRS/> IM: CPT | Performed by: FAMILY MEDICINE

## 2019-03-05 PROCEDURE — 90471 IMMUNIZATION ADMIN: CPT | Performed by: FAMILY MEDICINE

## 2019-03-05 RX ORDER — TRIAMCINOLONE ACETONIDE 1 MG/G
OINTMENT TOPICAL 2 TIMES DAILY
Qty: 30 G | Refills: 1 | Status: SHIPPED | OUTPATIENT
Start: 2019-03-05 | End: 2020-03-09

## 2019-03-05 NOTE — PROGRESS NOTES
SUBJECTIVE:   Sade Noyola is a 43 year old female who presents to clinic today for the following health issues:     toe pain    Onset: week and a half ago     Description:   Location: bone by the big toe on right side   Character: Sharp and Gnawing    Intensity: moderate    Progression of Symptoms: same    Accompanying Signs & Symptoms:  Other symptoms: numbness, tingling and swelling    History:   Previous similar pain: no       Precipitating factors:   Trauma or overuse: YES- exercise     Alleviating factors:  Improved by: rest/inactivity and Ibuprofen    Therapies Tried and outcome: rest has helped/ not wearing shoes     Saw chiro who ordered xray, results pending    Encounter Diagnoses   Name Primary?     Sprain of great toe of right foot, initial encounter            Need for prophylactic vaccination with tetanus-diphtheria (Td)      Dyshidrotic eczema on heels worse this winter Yes         Problem list and histories reviewed & adjusted, as indicated.  Additional history: as documented    Labs reviewed in EPIC    Reviewed and updated as needed this visit by clinical staff  Allergies  Meds       Reviewed and updated as needed this visit by Provider         ROS:  Constitutional, HEENT, cardiovascular, pulmonary, gi and gu systems are negative, except as otherwise noted.    OBJECTIVE:     There were no vitals taken for this visit.  There is no height or weight on file to calculate BMI.  GENERAL: healthy, alert and no distress  NECK: no adenopathy, no asymmetry, masses, or scars and thyroid normal to palpation  RESP: lungs clear to auscultation - no rales, rhonchi or wheezes  CV: regular rate and rhythm, normal S1 S2, no S3 or S4, no murmur, click or rub, no peripheral edema and peripheral pulses strong  MS  Foot exam: soft tissue swelling and tenderness at the lateral first MP joint, no tenderness of the medial malleolus, ankle joint is intact, normal microcirculation and capillary reflow.  X-ray:  ordered, but results not yet available.  SKIN: dry cracked skin on heels        ASSESSMENT/PLAN:             1. Sprain of right great toe, initial encounter  Improving   wear good supportive shoes   NSAID, ice suggested  activity modification  see primary care physician in follow up  See orders in EpicCare.         3. Need for prophylactic vaccination with tetanus-diphtheria (Td)  done  - TDAP, IM (10 - 64 YRS) - Adacel    4. Dyshidrotic eczema  Use lotion tid  - triamcinolone (KENALOG) 0.1 % external ointment; Apply topically 2 times daily  Dispense: 30 g; Refill: 1      Follow up only if unimproved.     Chi Gates MD  [place]

## 2019-03-11 ENCOUNTER — TELEPHONE (OUTPATIENT)
Dept: FAMILY MEDICINE | Facility: CLINIC | Age: 44
End: 2019-03-11

## 2019-03-11 NOTE — TELEPHONE ENCOUNTER
Reason for call:  Other   Patient called regarding (reason for call): call back  Additional comments: The patient called and stated that she had an x-ray done elsewhere on 3/1. She was told to call to talk to Dr. Gates or his nurse to give him an update. She would like a call back to discuss this.     Phone number to reach patient: [phone]      Best Time: Any    Can we leave a detailed message on this number?  YES

## 2019-03-11 NOTE — TELEPHONE ENCOUNTER
Dr. Gates,  Returned call to patient. Patient states she has new information regarding the x-ray that was completed at the chiropractor on 03/01/2019    Patient states she has an extra bone between her great and second toe. Chiropractor states it is perfectly formed and a normal abnormality.      Chiropractor states she irritated it by running and now it is inflamed.    Chiropractor advised to staff off and immobilize as much as possible. Chiropractor advised patient to talk to PCP for advise on boot or some sort of immobilization device    Please advise    Thank You!  Izzy Romero, RN  Triage Nurse

## 2019-03-14 ENCOUNTER — TELEPHONE (OUTPATIENT)
Dept: FAMILY MEDICINE | Facility: CLINIC | Age: 44
End: 2019-03-14

## 2019-03-14 DIAGNOSIS — S62.609S: Primary | ICD-10-CM

## 2019-03-14 NOTE — TELEPHONE ENCOUNTER
Reason for call:  Other   Patient called regarding (reason for call): call back  Additional comments: Pt broke hand and is needing a recommendation for a good hand doctor.    Phone number to reach patient:  Home number on file [phone] (home)    Best Time:  anytime    Can we leave a detailed message on this number?  YES

## 2019-03-14 NOTE — TELEPHONE ENCOUNTER
Dr. Gates,    Pt would like you to call her when you are able to. [phone].    Pt very worked up because she said that the ER won't tell her what is going on with what they see on the x-ray of her finger. Pt said that she doesn't know if it is cancer or what. Tried to calm patient down and tell her to take some deep breaths and try not to get ahead of herself.    Received call from patient who was in tears. She states that she is in [place] right now with her daughter. She broke her finger right ring and went to [facility] (ER). They told her that they found a bony growth on knuckle. (Pt has already broken her finger once before when she was 13 or 14. They are telling her that a hand surgeon needs to look at it. Pt will try to get a copy of the images to bring back with her.    They gave her a recommendation for the hand surgeon, but she is not sure if she should go to them, or [place] the cities and [place] someone [place].    Breanna Mills RN  [facility]

## 2019-03-19 ENCOUNTER — TRANSFERRED RECORDS (OUTPATIENT)
Dept: HEALTH INFORMATION MANAGEMENT | Facility: CLINIC | Age: 44
End: 2019-03-19

## 2019-04-15 ENCOUNTER — TRANSFERRED RECORDS (OUTPATIENT)
Dept: HEALTH INFORMATION MANAGEMENT | Facility: CLINIC | Age: 44
End: 2019-04-15

## 2019-05-06 ENCOUNTER — TRANSFERRED RECORDS (OUTPATIENT)
Dept: HEALTH INFORMATION MANAGEMENT | Facility: CLINIC | Age: 44
End: 2019-05-06

## 2019-05-08 ENCOUNTER — TELEPHONE (OUTPATIENT)
Dept: FAMILY MEDICINE | Facility: CLINIC | Age: 44
End: 2019-05-08

## 2019-05-31 ENCOUNTER — TELEPHONE (OUTPATIENT)
Dept: FAMILY MEDICINE | Facility: CLINIC | Age: 44
End: 2019-05-31

## 2019-05-31 NOTE — TELEPHONE ENCOUNTER
Call to pt with Dr. Linares response     Copied from routing comment  No I did not do any kind of pre op then    Claribel Velasquez RN   [facility]

## 2019-05-31 NOTE — TELEPHONE ENCOUNTER
Reason for call:  Other   Patient called regarding (reason for call): call back  Additional comments: Pt had a recent visit with Dr. baig on 3/5/19.  Pt is wondering if that recent visit is ok for a physical?  Pt is having surgery on Monday and the surgery center is calling in regards to her surgery.      Phone number to reach patient:  Home number on file [phone] (home)    Best Time:  anytime    Can we leave a detailed message on this number?  YES

## 2019-05-31 NOTE — TELEPHONE ENCOUNTER
Dr. Gates    For a cyst removal and strengthening of bone of the finger, not having general anesthesia  Per pt the surgery center was asking to see if the visit with you on 3/5/19 would be ok for the pre op    Advise    Claribel Velasquez RN   [facility]

## 2019-05-31 NOTE — TELEPHONE ENCOUNTER
Dr. Gates    See pt message    Claribel Velasquez, NADER   [facility]

## 2019-08-30 DIAGNOSIS — R11.0 CHRONIC NAUSEA: ICD-10-CM

## 2019-09-03 RX ORDER — ONDANSETRON 4 MG/1
TABLET, FILM COATED ORAL
Qty: 60 TABLET | Refills: 0 | Status: SHIPPED | OUTPATIENT
Start: 2019-09-03 | End: 2019-10-21

## 2019-09-03 NOTE — TELEPHONE ENCOUNTER
Prescription approved per [facility] Refill Protocol.    Breanna Mccain RN  [facility]

## 2019-09-03 NOTE — TELEPHONE ENCOUNTER
"Requested Prescriptions   Pending Prescriptions Disp Refills     ondansetron (ZOFRAN) 4 MG tablet [Pharmacy Med Name: ONDANSETRON 4MG TABLETS] 60 tablet 0     Sig: TAKE 1 TABLET(4 MG) BY MOUTH EVERY 8 HOURS AS NEEDED FOR NAUSEA  Last Written Prescription Date:  02/19/2019  Last Fill Quantity: 60,  # refills: 3   Last office visit: 3/5/2019 with prescribing provider:  03/05/2019   Future Office Visit:          Antivertigo/Antiemetic Agents Passed - 8/30/2019  5:36 PM        Passed - Recent (12 mo) or future (30 days) visit within the authorizing provider's specialty     Patient had office visit in the last 12 months or has a visit in the next 30 days with authorizing provider or within the authorizing provider's specialty.  See \"Patient Info\" tab in inbasket, or \"Choose Columns\" in Meds & Orders section of the refill encounter.              Passed - Medication is active on med list        Passed - Patient is 18 years of age or older        "

## 2019-10-21 DIAGNOSIS — R11.0 CHRONIC NAUSEA: ICD-10-CM

## 2019-10-21 RX ORDER — ONDANSETRON 4 MG/1
4 TABLET, FILM COATED ORAL EVERY 8 HOURS PRN
Qty: 60 TABLET | Refills: 0 | Status: SHIPPED | OUTPATIENT
Start: 2019-10-21 | End: 2020-03-11

## 2019-10-21 NOTE — TELEPHONE ENCOUNTER
"Requested Prescriptions   Pending Prescriptions Disp Refills     ondansetron (ZOFRAN) 4 MG tablet    Last Written Prescription Date:  09/03/2019  Last Fill Quantity: 60,  # refills: 0   Last office visit: 3/5/2019 with prescribing provider:  03/05/2019   Future Office Visit:   60 tablet 0        Antivertigo/Antiemetic Agents Passed - 10/21/2019  2:15 PM        Passed - Recent (12 mo) or future (30 days) visit within the authorizing provider's specialty     Patient has had an office visit with the authorizing provider or a provider within the authorizing providers department within the previous 12 mos or has a future within next 30 days. See \"Patient Info\" tab in inbasket, or \"Choose Columns\" in Meds & Orders section of the refill encounter.              Passed - Medication is active on med list        Passed - Patient is 18 years of age or older        "

## 2019-10-21 NOTE — TELEPHONE ENCOUNTER
Prescription approved per [facility] Refill Protocol. Carol Ann Hogan RN October 21, 2019 3:53 PM

## 2019-10-21 NOTE — TELEPHONE ENCOUNTER
Pt lost medication and needs a new refill. Pt has no medication  Pt can be reached [phone] daisy

## 2020-01-16 ENCOUNTER — TELEPHONE (OUTPATIENT)
Dept: FAMILY MEDICINE | Facility: CLINIC | Age: 45
End: 2020-01-16

## 2020-01-16 NOTE — TELEPHONE ENCOUNTER
Panel Management Review      Patient has the following on her problem list: None      Composite cancer screening  Chart review shows that this patient is due/due soon for the following Pap Smear  Summary:    Patient is due/failing the following:   PAP    Action needed:   Patient needs office visit for Pap Smear.    Type of outreach:    Phone, spoke to patient.  pt. stated that she will call back to schedule appointment in feb. Will look at her schedule to make physical appointment.     Questions for provider review:    None                                                                                                                                    Halley Caraballo MA       Chart routed to none .

## 2020-02-27 DIAGNOSIS — S13.9XXA SPRAIN OF NECK, INITIAL ENCOUNTER: ICD-10-CM

## 2020-02-27 NOTE — TELEPHONE ENCOUNTER
cyclobenzaprine (FLEXERIL) 10 MG tablet      Last Written Prescription Date:  02/19/2019  Last Fill Quantity: 90,   # refills: 3  Last Office Visit: 03/05/2019  Future Office visit:    Next 5 appointments (look out 90 days)    Mar 09, 2020  3:30 PM CDT  PHYSICAL with Chi Gates MD  [facility] ([facility]) [address] [place] [id number] [phone]           Routing refill request to provider for review/approval because:  Drug not on the [facility], [facility] or [facility] refill protocol or controlled substance

## 2020-02-27 NOTE — TELEPHONE ENCOUNTER
Pharmacy Queued    Patient has an appointment on 3/9 with LM, patient stated she will be out of medication before this appointment. Patient wanted to know if the provider would be willing to prescribed a 7 day refill that will cover her into her appointment on 3/9    Patient can be reached at [phone]  Okay to KIRK

## 2020-02-28 RX ORDER — CYCLOBENZAPRINE HCL 10 MG
10 TABLET ORAL 2 TIMES DAILY PRN
Qty: 30 TABLET | Refills: 0 | Status: SHIPPED | OUTPATIENT
Start: 2020-02-28 | End: 2021-06-17

## 2020-03-06 ENCOUNTER — TELEPHONE (OUTPATIENT)
Dept: FAMILY MEDICINE | Facility: CLINIC | Age: 45
End: 2020-03-06

## 2020-03-06 NOTE — TELEPHONE ENCOUNTER
Reason for call:  Other   Patient called regarding (reason for call): call back  Additional comments: pt is wondering if Dr. Gates would be willing to give her enough of her medications to get through May because she does not want to come into the clinic and risk being infected right now. Pt would be willing to schedule the appt for may right now if Yajaira is willing to give her more of her prescriptions. If Yajaira would rather see her, she is still willing to come in on Monday 3/9 but would rather not    Phone number to reach patient:  Home number on file [phone] (home)    Best Time:  b/a    Can we leave a detailed message on this number?  YES    Travel screening: Not Applicable

## 2020-03-09 ENCOUNTER — OFFICE VISIT (OUTPATIENT)
Dept: FAMILY MEDICINE | Facility: CLINIC | Age: 45
End: 2020-03-09
Payer: COMMERCIAL

## 2020-03-09 VITALS
WEIGHT: 135.56 LBS | OXYGEN SATURATION: 98 % | BODY MASS INDEX: 24.95 KG/M2 | SYSTOLIC BLOOD PRESSURE: 137 MMHG | DIASTOLIC BLOOD PRESSURE: 82 MMHG | HEIGHT: 62 IN | TEMPERATURE: 97.9 F | HEART RATE: 114 BPM

## 2020-03-09 DIAGNOSIS — R11.0 CHRONIC NAUSEA: ICD-10-CM

## 2020-03-09 DIAGNOSIS — Z00.00 ROUTINE GENERAL MEDICAL EXAMINATION AT A HEALTH CARE FACILITY: Primary | ICD-10-CM

## 2020-03-09 DIAGNOSIS — S13.9XXA SPRAIN OF NECK, INITIAL ENCOUNTER: ICD-10-CM

## 2020-03-09 DIAGNOSIS — F43.22 ADJUSTMENT DISORDER WITH ANXIETY: ICD-10-CM

## 2020-03-09 DIAGNOSIS — Z12.4 SCREENING FOR CERVICAL CANCER: ICD-10-CM

## 2020-03-09 PROCEDURE — G0145 SCR C/V CYTO,THINLAYER,RESCR: HCPCS | Performed by: FAMILY MEDICINE

## 2020-03-09 PROCEDURE — 99396 PREV VISIT EST AGE 40-64: CPT | Performed by: FAMILY MEDICINE

## 2020-03-09 PROCEDURE — 87624 HPV HI-RISK TYP POOLED RSLT: CPT | Performed by: FAMILY MEDICINE

## 2020-03-09 PROCEDURE — 80061 LIPID PANEL: CPT | Performed by: FAMILY MEDICINE

## 2020-03-09 PROCEDURE — 36415 COLL VENOUS BLD VENIPUNCTURE: CPT | Performed by: FAMILY MEDICINE

## 2020-03-09 PROCEDURE — 84443 ASSAY THYROID STIM HORMONE: CPT | Performed by: FAMILY MEDICINE

## 2020-03-09 RX ORDER — CYCLOBENZAPRINE HCL 10 MG
TABLET ORAL
Qty: 90 TABLET | Refills: 3 | Status: CANCELLED | OUTPATIENT
Start: 2020-03-09

## 2020-03-09 ASSESSMENT — MIFFLIN-ST. JEOR: SCORE: 1208.16

## 2020-03-09 NOTE — PROGRESS NOTES
SUBJECTIVE:   CC: Sade Noyola is an 44 year old woman who presents for preventive health visit.     Healthy Habits:    Do you get at least three servings of calcium containing foods daily (dairy, green leafy vegetables, etc.)? yes    Amount of exercise or daily activities, outside of work: 6 day(s) per week    Problems taking medications regularly No    Medication side effects: No    Have you had an eye exam in the past two years? no    Do you see a dentist twice per year? yes    Do you have sleep apnea, excessive snoring or daytime drowsiness?no      Encounter Diagnoses   Name Primary?     Routine general medical examination at a health care facility Yes     Screening for cervical cancer      Adjustment disorder with anxiety, doing betetr with exercise         Today's PHQ-2 Score:   PHQ-2 ( 1999 Pf[employer]) 3/9/2020 3/30/2017   Q1: Little interest or pleasure in doing things 0 0   Q2: Feeling down, depressed or hopeless 0 0   PHQ-2 Score 0 0       Abuse: Current or Past(Physical, Sexual or Emotional)- No  Do you feel safe in your environment? Yes        Social History     Tobacco Use     Smoking status: Never Smoker     Smokeless tobacco: Never Used   Substance Use Topics     Alcohol use: No     If you drink alcohol do you typically have >3 drinks per day or >7 drinks per week? No                     Reviewed orders with patient.  Reviewed health maintenance and updated orders accordingly - Yes  Lab work is in process    Mammogram Screening: Patient over age 50, mutual decision to screen reflected in health maintenance.    Pertinent mammograms are reviewed under the imaging tab.  History of abnormal Pap smear: NO - age 30-65 PAP every 5 years with negative HPV co-testing recommended  PAP / HPV 1/18/2006   PAP NIL     Reviewed and updated as needed this visit by clinical staff  Tobacco  Allergies  Meds  Med Hx  Surg Hx  Fam Hx  Soc Hx        Reviewed and updated as needed this visit by Provider       "  Past Medical History:   Diagnosis Date     Adjustment disorder with anxiety      Anxiety      Gastro-oesophageal reflux disease      GERD (gastroesophageal reflux disease)      Umbilical hernia without mention of obstruction or gangrene 9/2012     Urinary calculus, unspecified 8/03    Renal stones/Lithotripsy        ROS:  CONSTITUTIONAL: NEGATIVE for fever, chills, change in weight  INTEGUMENTARU/SKIN: NEGATIVE for worrisome rashes, moles or lesions  EYES: NEGATIVE for vision changes or irritation  ENT: NEGATIVE for ear, mouth and throat problems  RESP: NEGATIVE for significant cough or SOB  BREAST: NEGATIVE for masses, tenderness or discharge  CV: NEGATIVE for chest pain, palpitations or peripheral edema  GI: NEGATIVE for nausea, abdominal pain, heartburn, or change in bowel habits  : NEGATIVE for unusual urinary or vaginal symptoms. Periods are regular.  MUSCULOSKELETAL: NEGATIVE for significant arthralgias or myalgia  NEURO: NEGATIVE for weakness, dizziness or paresthesias  PSYCHIATRIC: NEGATIVE for changes in mood or affect    OBJECTIVE:   Pulse 114   Temp 97.9  F (36.6  C) (Oral)   Ht 1.575 m (5' 2\")   Wt 61.5 kg (135 lb 9 oz)   LMP 02/20/2020 (Exact Date)   SpO2 98%   Breastfeeding No   BMI 24.79 kg/m    EXAM:  GENERAL: healthy, alert and no distress  EYES: Eyes grossly normal to inspection, PERRL and conjunctivae and sclerae normal  HENT: ear canals and TM's normal, nose and mouth without ulcers or lesions  NECK: no adenopathy, no asymmetry, masses, or scars and thyroid normal to palpation  RESP: lungs clear to auscultation - no rales, rhonchi or wheezes  BREAST: normal without masses, tenderness or nipple discharge and no palpable axillary masses or adenopathy  CV: regular rate and rhythm, normal S1 S2, no S3 or S4, no murmur, click or rub, no peripheral edema and peripheral pulses strong  ABDOMEN: soft, nontender, no hepatosplenomegaly, no masses and bowel sounds normal   (female): normal " "female external genitalia, normal urethral meatus, vaginal mucosa, normal cervix/adnexa/uterus without masses or discharge  MS: no gross musculoskeletal defects noted, no edema  SKIN: no suspicious lesions or rashes  NEURO: Normal strength and tone, mentation intact and speech normal  PSYCH: mentation appears normal, tearful, anxious and judgement and insight intact    Diagnostic Test Results:  Labs reviewed in Epic    ASSESSMENT/PLAN:   1. Routine general medical examination at a health care facility  In good helth  - Lipid Profile  - TSH with free T4 reflex    2. Screening for cervical cancer  sent  - Pap imaged thin layer screen with HPV - recommended age 30 - 65  - HPV High Risk Types DNA Cervical    3. Adjustment disorder with anxiety  Well controlled   4. Recurrent Nausea; Well controlled with as needed zofran    COUNSELING:   Reviewed preventive health counseling, as reflected in patient instructions       Regular exercise       Healthy diet/nutrition       Vision screening       Hearing screening       Contraception       Family planning       Osteoporosis Prevention/Bone Health    Estimated body mass index is 24.79 kg/m  as calculated from the following:    Height as of this encounter: 1.575 m (5' 2\").    Weight as of this encounter: 61.5 kg (135 lb 9 oz).         reports that she has never smoked. She has never used smokeless tobacco.      Counseling Resources:  ATP IV Guidelines  Pooled Cohorts Equation Calculator  Breast Cancer Risk Calculator  FRAX Risk Assessment  ICSI Preventive Guidelines  Dietary Guidelines for Americans, 2010  USDA's MyPlate  ASA Prophylaxis  Lung CA Screening    Chi Gates MD  [facility]  "

## 2020-03-09 NOTE — LETTER
March 17, 2020    Sade Noyola  [address] [place] [id number]    Dear ,  This letter is regarding your recent Pap smear (cervical cancer screening) and Human Papillomavirus (HPV) test.  We are happy to inform you that your Pap smear result is normal. Cervical cancer is closely linked with certain types of HPV. Your results showed no evidence of high-risk HPV.  We recommend you have your next PAP smear and HPV test in 5 years.  You will still need to return to the clinic every year for an annual exam and other preventive tests.  If you have additional questions regarding this result, please call our registered nurse, Corinna at [phone].  Sincerely,    hCi Gates MD //[facility]

## 2020-03-09 NOTE — TELEPHONE ENCOUNTER
Dr Gates    See pt message  applarissatliliant today is at 330p for annual and pap    Advise    Claribel Velasquez RN   [facility]

## 2020-03-10 LAB
CHOLEST SERPL-MCNC: 287 MG/DL
HDLC SERPL-MCNC: 63 MG/DL
LDLC SERPL CALC-MCNC: 171 MG/DL
NONHDLC SERPL-MCNC: 211 MG/DL
TRIGL SERPL-MCNC: 201 MG/DL
TSH SERPL DL<=0.005 MIU/L-ACNC: 1.56 MU/L (ref 0.4–4)

## 2020-03-10 NOTE — TELEPHONE ENCOUNTER
"Requested Prescriptions   Pending Prescriptions Disp Refills        ondansetron (ZOFRAN) 4 MG tablet 60 tablet 0     Sig: Take 1 tablet (4 mg) by mouth every 8 hours as needed for nausea  Last Written Prescription Date:  10/21/2019  Last Fill Quantity: 60,  # refills: 0   Last office visit: 3/9/2020 with prescribing provider:  03/09/2020   Future Office Visit:          Antivertigo/Antiemetic Agents Passed - 3/9/2020  4:44 PM        Passed - Recent (12 mo) or future (30 days) visit within the authorizing provider's specialty     Patient has had an office visit with the authorizing provider or a provider within the authorizing providers department within the previous 12 mos or has a future within next 30 days. See \"Patient Info\" tab in inbasket, or \"Choose Columns\" in Meds & Orders section of the refill encounter.              Passed - Medication is active on med list        Passed - Patient is 18 years of age or older           cyclobenzaprine (FLEXERIL) 10 MG tablet      Last Written Prescription Date:  02/28/2020  Last Fill Quantity: 30,   # refills: 0  Last Office Visit: 03/09/2020  Future Office visit:       Routing refill request to provider for review/approval because:  Drug not on the [facility], [facility] or [facility] refill protocol or controlled substance  "

## 2020-03-11 ENCOUNTER — TELEPHONE (OUTPATIENT)
Dept: FAMILY MEDICINE | Facility: CLINIC | Age: 45
End: 2020-03-11

## 2020-03-11 DIAGNOSIS — L71.9 ROSACEA: Primary | ICD-10-CM

## 2020-03-11 RX ORDER — ONDANSETRON 4 MG/1
4 TABLET, FILM COATED ORAL EVERY 8 HOURS PRN
Qty: 60 TABLET | Refills: 0 | Status: SHIPPED | OUTPATIENT
Start: 2020-03-11 | End: 2020-03-26

## 2020-03-11 NOTE — TELEPHONE ENCOUNTER
Flexeril filled 2/28/20; declined as duplicate.  zofran prescription approved per [facility] Refill Protocol. Carol Ann Hogan RN March 11, 2020 9:13 AM

## 2020-03-11 NOTE — TELEPHONE ENCOUNTER
Reason for call:  Other   Patient called regarding (reason for call): call back  Additional comments: Patient called and stated she was seen by Dr. Gates for rash on her face. Patient said she forgot to ask for a prescription and rash is getting worse. Patient would prescription sent to [facility] in [place], phone number is [phone]    Phone number to reach patient:  Cell number on file:    No relevant phone numbers on file.    or Other phone number:  [phone]    Best Time:  na    Can we leave a detailed message on this number?  YES    Travel screening: Not Applicable

## 2020-03-11 NOTE — TELEPHONE ENCOUNTER
Dr Gates    See pt message    Pharm cued    Claribel Velasquez, RN   [facility]

## 2020-03-12 LAB
COPATH REPORT: NORMAL
PAP: NORMAL

## 2020-03-12 RX ORDER — METRONIDAZOLE 7.5 MG/G
GEL TOPICAL 2 TIMES DAILY
Qty: 45 G | Refills: 1 | Status: SHIPPED | OUTPATIENT
Start: 2020-03-12 | End: 2021-06-14

## 2020-03-12 NOTE — TELEPHONE ENCOUNTER
OK   Orders Placed This Encounter     metroNIDAZOLE (METROGEL) 0.75 % external gel     Sig: Apply topically 2 times daily     Dispense:  45 g     Refill:  1

## 2020-03-16 LAB
FINAL DIAGNOSIS: NORMAL
HPV HR 12 DNA CVX QL NAA+PROBE: NEGATIVE
HPV16 DNA SPEC QL NAA+PROBE: NEGATIVE
HPV18 DNA SPEC QL NAA+PROBE: NEGATIVE
SPECIMEN DESCRIPTION: NORMAL
SPECIMEN SOURCE CVX/VAG CYTO: NORMAL

## 2020-03-26 ENCOUNTER — TELEPHONE (OUTPATIENT)
Dept: FAMILY MEDICINE | Facility: CLINIC | Age: 45
End: 2020-03-26

## 2020-03-26 DIAGNOSIS — S13.9XXA SPRAIN OF NECK, INITIAL ENCOUNTER: ICD-10-CM

## 2020-03-26 DIAGNOSIS — R11.0 CHRONIC NAUSEA: ICD-10-CM

## 2020-03-26 RX ORDER — ONDANSETRON 4 MG/1
4 TABLET, FILM COATED ORAL EVERY 8 HOURS PRN
Qty: 60 TABLET | Refills: 11 | Status: SHIPPED | OUTPATIENT
Start: 2020-03-26 | End: 2020-08-31

## 2020-03-26 RX ORDER — CYCLOBENZAPRINE HCL 10 MG
TABLET ORAL
Qty: 90 TABLET | Refills: 3 | Status: SHIPPED | OUTPATIENT
Start: 2020-03-26 | End: 2021-02-15

## 2020-03-26 NOTE — TELEPHONE ENCOUNTER
Reason for call:  Other   Patient called regarding (reason for call): call back  Additional comments: The patient called and stated that she has a surgery coming up in the next couple of weeks. She has a few questions regarding the surgery and she would like a call back from Dr. Gates to discuss these questions with him if possible.     Phone number to reach patient:  Home number on file [phone] (home)    Best Time: Any    Can we leave a detailed message on this number?  YES     MED

## 2020-03-26 NOTE — TELEPHONE ENCOUNTER
"Requested Prescriptions   Pending Prescriptions Disp Refills     ondansetron (ZOFRAN) 4 MG tablet 60 tablet 0     Sig: Take 1 tablet (4 mg) by mouth every 8 hours as needed for nausea        Antivertigo/Antiemetic Agents Passed - 3/26/2020 10:46 AM        Passed - Recent (12 mo) or future (30 days) visit within the authorizing provider's specialty     Patient has had an office visit with the authorizing provider or a provider within the authorizing providers department within the previous 12 mos or has a future within next 30 days. See \"Patient Info\" tab in inbasket, or \"Choose Columns\" in Meds & Orders section of the refill encounter.              Passed - Medication is active on med list        Passed - Patient is 18 years of age or older           Zofran (Ondansetron)  Last Written Prescription Date:  3/11/2020  Last Fill Quantity: 60,  # refills: 0  Last office visit: 3/9/2020 with prescribing provider:  Chi Gates MD  Future Office Visit: Return in about 1 year (around 3/9/2021).    Routing refill request to provider for review/approval because:   Pt requesting refills to be available at the pharmacy for one year instead of calling every 30 days to request.     Dago BRADLEY [facility]    "

## 2020-03-26 NOTE — TELEPHONE ENCOUNTER
Requested Prescriptions   Pending Prescriptions Disp Refills     cyclobenzaprine (FLEXERIL) 10 MG tablet [Pharmacy Med Name: CYCLOBENZAPRINE 10MG TABLETS] 90 tablet 3     Sig: TAKE 1 TABLET BY MOUTH EVERY NIGHT AT BEDTIME AS NEEDED FOR MUSCLE SPASMS       There is no refill protocol information for this order        Last Written Prescription Date: 2/28/2020  Last Fill Quantity: 30,  # refills: 0   Last office visit: 3/9/2020 with prescribing provider: Chi Gates MD  Future Office Visit:  Return in about 1 year (around 3/9/2021).    Routing refill request to provider for review/approval because:   Pt requesting refills to be available at the pharmacy for one year instead of calling every 30 days to request.     Dago Grijalva RN   [facility]

## 2020-03-27 RX ORDER — ONDANSETRON 4 MG/1
4 TABLET, ORALLY DISINTEGRATING ORAL EVERY 8 HOURS PRN
Qty: 60 TABLET | Refills: 1 | Status: SHIPPED | OUTPATIENT
Start: 2020-03-27 | End: 2020-06-01

## 2020-06-01 ENCOUNTER — TELEPHONE (OUTPATIENT)
Dept: FAMILY MEDICINE | Facility: CLINIC | Age: 45
End: 2020-06-01

## 2020-06-01 DIAGNOSIS — R11.0 CHRONIC NAUSEA: ICD-10-CM

## 2020-06-01 RX ORDER — ONDANSETRON 4 MG/1
4 TABLET, ORALLY DISINTEGRATING ORAL EVERY 8 HOURS PRN
Qty: 60 TABLET | Refills: 1 | Status: SHIPPED | OUTPATIENT
Start: 2020-06-01

## 2020-06-01 NOTE — TELEPHONE ENCOUNTER
Reason for call:  Other   Patient called regarding (reason for call): prescription  Additional comments: pt had a change in insurance policy and ondansetron (ZOFRAN-ODT) 4 MG ODT tab [24159] (Order [id number]) is now considered a specialty medication and so must be sent through [employer]. The Rep at [employer] informed pt no Prior Auth is needed, just need to fax it to them. The prescription can be faxed to [phone] and company can be reached by phone at [phone]. Pt would like this done by the end of the week    Phone number to reach patient:  Home number on file [phone] (home)    Best Time:  n/a    Can we leave a detailed message on this number?  YES    Travel screening: Not Applicable

## 2020-06-01 NOTE — TELEPHONE ENCOUNTER
Dr. Gates,    Please see note below. Med cued for local print.  Route to [facility] to fax.    Requested Prescriptions   Pending Prescriptions Disp Refills     ondansetron (ZOFRAN-ODT) 4 MG ODT tab 60 tablet 1     Sig: Take 1 tablet (4 mg) by mouth every 8 hours as needed for nausea       There is no refill protocol information for this order        Last Written Prescription Date:  3/27/20  Last Fill Quantity: 60,   # refills: 1  Last Office Visit: 3/9/20  Future Office visit:       Routing refill request to provider for review/approval because:  Drug not on the [facility], [facility] or [facility] refill protocol or controlled substance    Thanks  Blessing Dietrich RN   [facility]

## 2020-06-02 NOTE — TELEPHONE ENCOUNTER
Per [facility]- Drug not covered by patient plan. The preferred alternative is ondansetrontaagustin Please call/fax the pharmacy to change medication along with strength, directions quanity, and refills.

## 2020-06-03 NOTE — TELEPHONE ENCOUNTER
TC-please fax the 6/1 script for Zofran to the specialty pharmacy, not [facility].     Additional comments: pt had a change in insurance policy and ondansetron (ZOFRAN-ODT) 4 MG ODT tab  Med now considered a specialty medication and so must be sent through [employer]. The Rep at [employer] informed pt no Prior Auth is needed, just need to fax it to them. The prescription can be faxed to [phone] and company can be reached by phone at [phone]. Pt would like this done by the end of the week     Phone number to reach patient:  Home number on file [phone] (home)    Pt will need to call specialty pharmacy number [phone] if further assistance is needed.     Dago Grijalva RN   [facility]

## 2020-06-03 NOTE — TELEPHONE ENCOUNTER
[facility] specialty pharmacy calling to get a verbal in order to expedite Zofran script. Verbal given to pharmacist.      Dago Grijalva RN   [facility]

## 2020-08-31 ENCOUNTER — TELEPHONE (OUTPATIENT)
Dept: FAMILY MEDICINE | Facility: CLINIC | Age: 45
End: 2020-08-31

## 2020-08-31 DIAGNOSIS — R11.0 CHRONIC NAUSEA: ICD-10-CM

## 2020-08-31 RX ORDER — ONDANSETRON 4 MG/1
4 TABLET, FILM COATED ORAL EVERY 8 HOURS PRN
Qty: 60 TABLET | Refills: 4 | Status: SHIPPED | OUTPATIENT
Start: 2020-08-31 | End: 2020-11-21

## 2020-08-31 NOTE — TELEPHONE ENCOUNTER
Left vm for pt to return call to clinic  We need more info on the pharmacy, name of pharmacy, address    Claribel Velasquez RN   [facility]

## 2020-08-31 NOTE — TELEPHONE ENCOUNTER
Last Written Prescription Date:  6/1/2020  Last Fill Quantity: 60,  # refills: 1   Last office visit: 3/9/2020 with prescribing provider:   Future Office Visit:    On the dissolvable  Prescription approved per [facility] Refill Protocol.    Pt requesting the regular  Claribelnory Velasquez RN   [facility]

## 2020-08-31 NOTE — TELEPHONE ENCOUNTER
Reason for call:  Other   Patient called regarding (reason for call): call back  Additional comments: pt calling because when she picked up her prescription for ondansetron (ZOFRAN-ODT) 4 MG ODT tab [[id number]] (Order [id number]) it was in a dissolvable tablet form, but she used to get tablets to just swallow. She would like to return to the regular tablets as she does not think the dissolvable tablets work as well. Would still needs these sent to the speciality pharmacy, their phone number is [phone].    Phone number to reach patient:  Home number on file [phone] (home)    Best Time:  n/a    Can we leave a detailed message on this number?  YES    Travel screening: Not Applicable

## 2020-11-20 DIAGNOSIS — R11.0 CHRONIC NAUSEA: ICD-10-CM

## 2020-11-21 RX ORDER — ONDANSETRON 4 MG/1
4 TABLET, FILM COATED ORAL EVERY 8 HOURS PRN
Qty: 60 TABLET | Refills: 4 | Status: SHIPPED | OUTPATIENT
Start: 2020-11-21 | End: 2021-06-17

## 2020-11-22 NOTE — TELEPHONE ENCOUNTER
"Requested Prescriptions   Pending Prescriptions Disp Refills     ondansetron (ZOFRAN) 4 MG tablet 60 tablet 4     Sig: Take 1 tablet (4 mg) by mouth every 8 hours as needed for nausea        Antivertigo/Antiemetic Agents Passed - 11/20/2020  2:58 PM        Passed - Recent (12 mo) or future (30 days) visit within the authorizing provider's specialty     Patient has had an office visit with the authorizing provider or a provider within the authorizing providers department within the previous 12 mos or has a future within next 30 days. See \"Patient Info\" tab in inbasket, or \"Choose Columns\" in Meds & Orders section of the refill encounter.              Passed - Medication is active on med list        Passed - Patient is 18 years of age or older           Signed Prescriptions:                        Disp   Refills    ondansetron (ZOFRAN) 4 MG tablet           60 tab*4        Sig: Take 1 tablet (4 mg) by mouth every 8 hours as needed           for nausea  Authorizing Provider: NELLY HERNANDEZ  Ordering User: BAL OROURKE      "

## 2021-02-15 DIAGNOSIS — S13.9XXA SPRAIN OF NECK, INITIAL ENCOUNTER: ICD-10-CM

## 2021-02-15 NOTE — TELEPHONE ENCOUNTER
Added this message to previous message and sent to provider  No further action needed on this one    Claribel Velasquez RN   [facility]

## 2021-02-15 NOTE — TELEPHONE ENCOUNTER
Dr Gates    See pt MyChart message and  2nd message from pt copied to this one  Reason for call:  Other   Patient called regarding (reason for call): pt is scheduled for may 14th do to waiting for 2nd dose of covid vaccine. Pt would like if doc could provide enough meds (flexeril) 10 mg tablet until that date please     Med cued  There are 3 flexeril orders in her meds can we cancel ones that are not being used?

## 2021-02-15 NOTE — TELEPHONE ENCOUNTER
Reason for Call:  Other prescription flexeril for jaw clench. Wanting to due physical in May after getting covid vaccines. Could you refill this?    Detailed comments:     Phone Number Patient can be reached at: Home number on file [phone] (home)    Best Time: any    Can we leave a detailed message on this number? YES    Call taken on 2/15/2021 at 10:55 AM by Shauna Britt

## 2021-02-15 NOTE — TELEPHONE ENCOUNTER
Reason for call:  Other   Patient called regarding (reason for call): pt is scheduled for may 14th do to waiting for 2nd dose of covid vaccine. Pt would like if doc could provide enough meds (flexeril) 10 mg tablet until that date please     Additional comments:     Phone number to reach patient:  Cell number on file:    [phone]    Best Time:  Anytime     Can we leave a detailed message on this number?  YES    Travel screening: Not Applicable

## 2021-02-22 RX ORDER — CYCLOBENZAPRINE HCL 10 MG
10 TABLET ORAL 2 TIMES DAILY PRN
Qty: 90 TABLET | Refills: 1 | Status: SHIPPED | OUTPATIENT
Start: 2021-02-22 | End: 2021-06-14

## 2021-02-22 NOTE — TELEPHONE ENCOUNTER
Dr Gates    Pharm cued, pt wants it at [facility]    Claribel Velasquez, RN   [facility]

## 2021-06-01 VITALS — HEIGHT: 62 IN | WEIGHT: 122.6 LBS | BODY MASS INDEX: 22.56 KG/M2

## 2021-06-01 VITALS — BODY MASS INDEX: 23.21 KG/M2 | WEIGHT: 131 LBS

## 2021-06-14 ENCOUNTER — OFFICE VISIT (OUTPATIENT)
Dept: FAMILY MEDICINE | Facility: CLINIC | Age: 46
End: 2021-06-14
Payer: COMMERCIAL

## 2021-06-14 VITALS
TEMPERATURE: 98.2 F | HEART RATE: 104 BPM | WEIGHT: 113.31 LBS | HEIGHT: 62 IN | BODY MASS INDEX: 20.85 KG/M2 | OXYGEN SATURATION: 98 % | SYSTOLIC BLOOD PRESSURE: 120 MMHG | DIASTOLIC BLOOD PRESSURE: 71 MMHG

## 2021-06-14 DIAGNOSIS — F43.22 ADJUSTMENT DISORDER WITH ANXIETY: ICD-10-CM

## 2021-06-14 DIAGNOSIS — Z12.31 ENCOUNTER FOR SCREENING MAMMOGRAM FOR BREAST CANCER: ICD-10-CM

## 2021-06-14 DIAGNOSIS — R11.0 NAUSEA: ICD-10-CM

## 2021-06-14 DIAGNOSIS — Z00.00 ROUTINE GENERAL MEDICAL EXAMINATION AT A HEALTH CARE FACILITY: Primary | ICD-10-CM

## 2021-06-14 PROCEDURE — 80053 COMPREHEN METABOLIC PANEL: CPT | Performed by: FAMILY MEDICINE

## 2021-06-14 PROCEDURE — 80061 LIPID PANEL: CPT | Performed by: FAMILY MEDICINE

## 2021-06-14 PROCEDURE — 99396 PREV VISIT EST AGE 40-64: CPT | Performed by: FAMILY MEDICINE

## 2021-06-14 PROCEDURE — 36415 COLL VENOUS BLD VENIPUNCTURE: CPT | Performed by: FAMILY MEDICINE

## 2021-06-14 PROCEDURE — 84443 ASSAY THYROID STIM HORMONE: CPT | Performed by: FAMILY MEDICINE

## 2021-06-14 RX ORDER — CHLORAL HYDRATE 500 MG
2 CAPSULE ORAL DAILY
COMMUNITY

## 2021-06-14 ASSESSMENT — MIFFLIN-ST. JEOR: SCORE: 1100.48

## 2021-06-14 NOTE — PROGRESS NOTES
SUBJECTIVE:   CC: Sade Noyola is an 46 year old woman who presents for preventive health visit.     {  Healthy Habits:    Do you get at least three servings of calcium containing foods daily (dairy, green leafy vegetables, etc.)? yes    Amount of exercise or daily activities, outside of work: 5-6 day(s) per week    Problems taking medications regularly No    Medication side effects: No    Have you had an eye exam in the past two years? no    Do you see a dentist twice per year? yes    Do you have sleep apnea, excessive snoring or daytime drowsiness?no    Encounter Diagnoses   Name Primary?     Routine general medical examination at a health care facility Yes     Encounter for screening mammogram for breast cancer      Adjustment disorder with anxiety doing better with waking/ weigh loss, sleep better with flexeril      Nausea Well controlled with Zofran           Today's PHQ-2 Score:   PHQ-2 ( 1999 Pfizer) 6/14/2021 3/9/2020   Q1: Little interest or pleasure in doing things 0 0   Q2: Feeling down, depressed or hopeless 0 0   PHQ-2 Score 0 0       Abuse: Current or Past(Physical, Sexual or Emotional)- No  Do you feel safe in your environment? Yes    Have you ever done Advance Care Planning? (For example, a Health Directive, POLST, or a discussion with a medical provider or your loved ones about your wishes): No, advance care planning information given to patient to review.  Patient declined advance care planning discussion at this time.    Social History     Tobacco Use     Smoking status: Never Smoker     Smokeless tobacco: Never Used   Substance Use Topics     Alcohol use: No     If you drink alcohol do you typically have >3 drinks per day or >7 drinks per week? No                     Reviewed orders with patient.  Reviewed health maintenance and updated orders accordingly - Yes  Lab work is in process    FSH-7: No flowsheet data found.    Mammogram Screening: Recommended annual mammography  Pertinent  "mammograms are reviewed under the imaging tab.    Pertinent mammograms are reviewed under the imaging tab.  History of abnormal Pap smear: NO - age 30-65 PAP every 5 years with negative HPV co-testing recommended  PAP / HPV Latest Ref Rng & Units 3/9/2020 1/18/2006   PAP - NIL NIL   HPV 16 DNA NEG:Negative Negative -   HPV 18 DNA NEG:Negative Negative -   OTHER HR HPV NEG:Negative Negative -     Reviewed and updated as needed this visit by clinical staff  Tobacco  Allergies  Meds  Problems  Med Hx  Surg Hx  Fam Hx          Reviewed and updated as needed this visit by Provider                Past Medical History:   Diagnosis Date     Adjustment disorder with anxiety      Anxiety      Gastro-oesophageal reflux disease      GERD (gastroesophageal reflux disease)      Umbilical hernia without mention of obstruction or gangrene 9/2012     Urinary calculus, unspecified 8/03    Renal stones/Lithotripsy        ROS:  CONSTITUTIONAL: NEGATIVE for fever, chills,   INTEGUMENTARU/SKIN: NEGATIVE for worrisome rashes, moles or lesions  EYES: NEGATIVE for vision changes or irritation  ENT: NEGATIVE for ear, mouth and throat problems  RESP: NEGATIVE for significant cough or SOB  BREAST: NEGATIVE for masses, tenderness or discharge  CV: NEGATIVE for chest pain, palpitations or peripheral edema  GI: NEGATIVE for nausea, abdominal pain, heartburn, or change in bowel habits  : NEGATIVE for unusual urinary or vaginal symptoms. Periods are regular.  MUSCULOSKELETAL: NEGATIVE for significant arthralgias or myalgia  NEURO: NEGATIVE for weakness, dizziness or paresthesias  PSYCHIATRIC: NEGATIVE for changes in mood or affect    OBJECTIVE:   Pulse 104   Temp 98.2  F (36.8  C) (Temporal)   Ht 1.58 m (5' 2.21\")   Wt 51.4 kg (113 lb 5 oz)   LMP 05/04/2021 (Approximate)   SpO2 98%   BMI 20.59 kg/m    EXAM:  GENERAL: healthy, alert and no distress  HENT: ear canals and TM's normal, nose and mouth without ulcers or lesions  NECK: no " "adenopathy, no asymmetry, masses, or scars and thyroid normal to palpation  RESP: lungs clear to auscultation - no rales, rhonchi or wheezes  BREAST: normal without masses, tenderness or nipple discharge and no palpable axillary masses or adenopathy  CV: regular rate and rhythm, normal S1 S2, no S3 or S4, no murmur, click or rub, no peripheral edema and peripheral pulses strong  ABDOMEN: soft, nontender, no hepatosplenomegaly, no masses and bowel sounds normal   (female): normal female external genitalia, normal urethral meatus , vaginal mucosa pink, moist, well rugated and normal cervix, adnexae, and uterus without masses.  MS: no gross musculoskeletal defects noted, no edema  SKIN: no suspicious lesions or rashes  NEURO: Normal strength and tone, mentation intact and speech normal  PSYCH: mentation appears normal, affect normal/bright  LYMPH: no cervical, supraclavicular, axillary, or inguinal adenopathy    Diagnostic Test Results:  Labs reviewed in Epic    ASSESSMENT/PLAN:   1. Routine general medical examination at a health care facility  [facility] well  - *MA Screening Digital Bilateral; Future  - Lipid panel reflex to direct LDL Fasting; Future  - **TSH with free T4 reflex FUTURE anytime; Future  - **Comprehensive metabolic panel FUTURE anytime; Future  2. Encounter for screening mammogram for breast cancer  will do      3. Adjustment disorder with anxiety  Well controlled     4. Nausea  Use zofran prn       COUNSELING:   Reviewed preventive health counseling, as reflected in patient instructions       Regular exercise       Healthy diet/nutrition       Vision screening       Hearing screening       Osteoporosis prevention/bone health       Safe sex practices/STD prevention       Colon cancer screening       (Ivonne)menopause management    Estimated body mass index is 20.59 kg/m  as calculated from the following:    Height as of this encounter: 1.58 m (5' 2.21\").    Weight as of this encounter: 51.4 kg (113 lb " 5 oz).        She reports that she has never smoked. She has never used smokeless tobacco.      Counseling Resources:  ATP IV Guidelines  Pooled Cohorts Equation Calculator  Breast Cancer Risk Calculator  BRCA-Related Cancer Risk Assessment: FHS-7 Tool  FRAX Risk Assessment  ICSI Preventive Guidelines  Dietary Guidelines for Americans, 2010  USDA's MyPlate  ASA Prophylaxis  Lung CA Screening    Chi Gates MD  [facility]

## 2021-06-15 LAB
ALBUMIN SERPL-MCNC: 3.8 G/DL (ref 3.4–5)
ALP SERPL-CCNC: 61 U/L (ref 40–150)
ALT SERPL W P-5'-P-CCNC: 19 U/L (ref 0–50)
ANION GAP SERPL CALCULATED.3IONS-SCNC: 6 MMOL/L (ref 3–14)
AST SERPL W P-5'-P-CCNC: 12 U/L (ref 0–45)
BILIRUB SERPL-MCNC: 0.8 MG/DL (ref 0.2–1.3)
BUN SERPL-MCNC: 13 MG/DL (ref 7–30)
CALCIUM SERPL-MCNC: 9.3 MG/DL (ref 8.5–10.1)
CHLORIDE SERPL-SCNC: 105 MMOL/L (ref 94–109)
CHOLEST SERPL-MCNC: 240 MG/DL
CO2 SERPL-SCNC: 26 MMOL/L (ref 20–32)
CREAT SERPL-MCNC: 0.84 MG/DL (ref 0.52–1.04)
GFR SERPL CREATININE-BSD FRML MDRD: 83 ML/MIN/{1.73_M2}
GLUCOSE SERPL-MCNC: 101 MG/DL (ref 70–99)
HDLC SERPL-MCNC: 79 MG/DL
LDLC SERPL CALC-MCNC: 142 MG/DL
NONHDLC SERPL-MCNC: 161 MG/DL
POTASSIUM SERPL-SCNC: 4.9 MMOL/L (ref 3.4–5.3)
PROT SERPL-MCNC: 7.2 G/DL (ref 6.8–8.8)
SODIUM SERPL-SCNC: 137 MMOL/L (ref 133–144)
TRIGL SERPL-MCNC: 98 MG/DL
TSH SERPL DL<=0.005 MIU/L-ACNC: 1.47 MU/L (ref 0.4–4)

## 2021-06-17 DIAGNOSIS — S13.9XXA SPRAIN OF NECK, INITIAL ENCOUNTER: ICD-10-CM

## 2021-06-17 DIAGNOSIS — R11.0 CHRONIC NAUSEA: ICD-10-CM

## 2021-06-17 RX ORDER — CYCLOBENZAPRINE HCL 10 MG
10 TABLET ORAL 2 TIMES DAILY PRN
Qty: 30 TABLET | Refills: 11 | Status: SHIPPED | OUTPATIENT
Start: 2021-06-17 | End: 2022-06-28

## 2021-06-17 RX ORDER — ONDANSETRON 4 MG/1
4 TABLET, FILM COATED ORAL EVERY 8 HOURS PRN
Qty: 60 TABLET | Refills: 4 | Status: SHIPPED | OUTPATIENT
Start: 2021-06-17 | End: 2022-06-28

## 2021-06-17 NOTE — TELEPHONE ENCOUNTER
"Dr. Gates,    Requested Prescriptions   Pending Prescriptions Disp Refills     cyclobenzaprine (FLEXERIL) 10 MG tablet 30 tablet 11     Sig: Take 1 tablet (10 mg) by mouth 2 times daily as needed for muscle spasms       There is no refill protocol information for this order      Signed Prescriptions Disp Refills    ondansetron (ZOFRAN) 4 MG tablet 60 tablet 4     Sig: Take 1 tablet (4 mg) by mouth every 8 hours as needed for nausea        Antivertigo/Antiemetic Agents Passed - 6/17/2021 12:31 PM        Passed - Recent (12 mo) or future (30 days) visit within the authorizing provider's specialty     Patient has had an office visit with the authorizing provider or a provider within the authorizing providers department within the previous 12 mos or has a future within next 30 days. See \"Patient Info\" tab in inbasket, or \"Choose Columns\" in Meds & Orders section of the refill encounter.              Passed - Medication is active on med list        Passed - Patient is 18 years of age or older           Routing refill request to provider for review/approval because:  Drug not on the [facility] refill protocol     Miranda Zambrano RN  [facility]          "

## 2021-06-19 NOTE — ANESTHESIA CARE TRANSFER NOTE
Last vitals:   Vitals:    07/27/18 1945   BP: (P) 113/67   Pulse: (P) 91   Resp: (P) 12   Temp: (P) 36.3  C (97.4  F)   SpO2: (P) 100%     Patient's level of consciousness is drowsy  Spontaneous respirations: yes  Maintains airway independently: yes  Dentition unchanged: yes  Oropharynx: oral airway in place    QCDR Measures:  ASA# 20 - Surgical Safety Checklist: WHO surgical safety checklist completed prior to induction  PQRS# 430 - Adult PONV Prevention: 4558F - Pt received => 2 anti-emetic agents (different classes) preop & intraop  ASA# 8 - Peds PONV Prevention: NA - Not pediatric patient, not GA or 2 or more risk factors NOT present  PQRS# 424 - Ivonne-op Temp Management: 4559F - At least one body temp DOCUMENTED => 35.5C or 95.9F within required timeframe  PQRS# 426 - PACU Transfer Protocol: - Transfer of care checklist used  ASA# 14 - Acute Post-op Pain: ASA14B - Patient did NOT experience pain >= 7 out of 10

## 2021-06-19 NOTE — ANESTHESIA PREPROCEDURE EVALUATION
Anesthesia Evaluation      Patient summary reviewed   No history of anesthetic complications     Airway   Mallampati: I  Neck ROM: full   Pulmonary     breath sounds clear to auscultation  (-) pneumonia, COPD, asthma, shortness of breath, recent URI, sleep apnea, decreased breath sounds, wheezes, rales, stridor, not a smoker                         Cardiovascular   Exercise tolerance: > or = 4 METS  (-) hypertension, past MI, CAD, angina, murmur, hypercholesterolemia  Rhythm: regular  Rate: normal,    no murmur      Neuro/Psych    (-) no seizures, no depression, no anxiety/panic attacks, no dementia, no chronic pain    Endo/Other    (-) no diabetes, hypothyroidism, arthritis, no obesity     GI/Hepatic/Renal    (+)   chronic renal disease,   (-) GERD, bowel prep, esophageal disease     Other findings: Labs 7/31/18:  Hgb 14.7, Plt 280  Na 137, K 4.0, Cr 0.83      Dental - normal exam     Comment: No loose, chipped, partial, removable or dentures.,                       Anesthesia Plan  Planned anesthetic: general endotracheal  Extremely anxious and tearful, had a bad experience with anesthesia providers / surgeons at another facility. Does not want to wake up nauseated. Plan for dexamethasone, propofol gtt background and zofran. Does not want to receive too much midazolam either and said that she was sedated with 1 mg previously. Had issues with shivering and then received too much demerol per her report and was sedated and admitted overnight.  ASA 2   Induction: intravenous   Anesthetic plan and risks discussed with: patient  Anesthesia plan special considerations: rapid sequence induction,   Post-op plan: routine recovery

## 2021-06-19 NOTE — PROGRESS NOTES
Assessment/Plan:        Diagnoses and all orders for this visit:    Calculus of ureter  -     Cystoscopy with Stent Removal Education  -     Patient Stated Goal: Prevent further stones  -     CT Abdomen Pelvis Without Oral Without IV Contrast; Future; Expected date: 9/5/18    Urinary tract stones  -     Urinalysis Macroscopic  -     Culture, Urine- Future; Future; Expected date: 9/5/18  -     Culture, Urine- Future    Other orders  -     nitrofurantoin (macrocrystal-monohydrate) 100 MG capsule 100 mg (MACROBID); Take 1 capsule (100 mg total) by mouth once.  -     nitrofurantoin (macrocrystal-monohydrate) (MACROBID) 100 MG capsule;       Stone Management Plan  [facility] Stone Management 2/16/2015 7/31/2018 8/6/2018   Urinary Tract Infection No suspicion of infection No suspicion of infection No suspicion of infection   Renal Colic Asymptomatic at this time Inadequate outpatient management of symptoms Well controlled symptoms   Renal Failure No suspicion of renal failure No suspicion of renal failure No suspicion of renal failure   R Stone Event Resolved event No current event No current event   Resolved date 2/16/2015 - -   R Post-op status No imaging - -   L Stone Event No current event Established event Established event   Post-op status - Unanticipated Clinic Visit Stent Removal   L Current Plan - - -   Observe rationale - - -             Subjective:      HPI  Ms. Sade Noyola is a 43 y.o.  female returning to the [facility] [place] for early postoperative follow up for anticipated stent removal.     She returns status post left ureteroscopic laser lithotripsy for renal and ureteral stone. She has had no unanticipated post-operative events.    She has had no symptoms suspicious for infection and stent was moderately symptomatic with typical issues of flank discomfort and lower urinary tract irritation.     Flexible cystoscopy is performed and indwelling stent is removed without  incident.    She will follow up in the office in one month with imaging.    She remains extremely anxious.     ROS   Review of systems is negative except for HPI.    Past Medical History:   Diagnosis Date     Kidney stones        Past Surgical History:   Procedure Laterality Date     LITHOTRIPSY       TN CYSTO/URETERO W/LITHOTRIPSY &INDWELL STENT INSRT Left 7/31/2018    Procedure: CYSTOSCOPY, LEFT  URETEROSCOPY, LASER LITHOTRIPSY STENT REMOVAL,  STENT INSERTION;  Surgeon: Aaron Ibarra MD;  Location: [facility];  Service: Urology       Current Outpatient Prescriptions   Medication Sig Dispense Refill     cyclobenzaprine (FLEXERIL) 10 MG tablet Take 5 mg by mouth at bedtime as needed.  0     diphenhydrAMINE-acetaminophen (TYLENOL PM)  mg Tab Take 0.5 tablets by mouth at bedtime.        HYDROmorphone (DILAUDID) 2 MG tablet 1-2 tabs every 4 hours PRN pain 20 tablet 0     lactobacillus rhamnosus GG (CULTURELLE) 10-15 Billion cell capsule Take 1 capsule by mouth daily.       multivitamin therapeutic tablet Take 1 tablet by mouth daily.       omega-3/dha/epa/fish oil (FISH OIL-OMEGA-3 FATTY ACIDS) 300-1,000 mg capsule Take 2 g by mouth daily.       ondansetron (ZOFRAN) 4 MG tablet Take 4 mg by mouth every 8 (eight) hours as needed for nausea.       ondansetron (ZOFRAN-ODT) 4 MG disintegrating tablet Take 1 tablet (4 mg total) by mouth every 6 (six) hours as needed for nausea. 10 tablet 1     ranitidine (ZANTAC) 150 MG tablet Take 150 mg by mouth 2 (two) times a day.       tolterodine (DETROL) 2 MG tablet Take 1 tablet (2 mg total) by mouth 2 (two) times a day. 30 tablet 1     Current Facility-Administered Medications   Medication Dose Route Frequency Provider Last Rate Last Dose     nitrofurantoin (macrocrystal-monohydrate) (MACROBID) 100 MG capsule              nitrofurantoin (macrocrystal-monohydrate) 100 MG capsule 100 mg (MACROBID)  100 mg Oral Once Aaron Ibarra MD           Allergies   Allergen  Reactions     Ciprofloxacin Hives     Claritin [Loratadine] Hives     Flushing. Tolerates Benadryl fine.      Penicillin G Benzathine Hives     Patient can take keflex without problems     Sulfa (Sulfonamide Antibiotics) Hives     Compazine [Prochlorperazine Edisylate] Anxiety       Social History     Social History     Marital status:      Spouse name: N/A     Number of children: N/A     Years of education: N/A     Occupational History     Not on file.     Social History Main Topics     Smoking status: Never Smoker     Smokeless tobacco: Never Used     Alcohol use No     Drug use: No     Sexual activity: Not on file     Other Topics Concern     Not on file     Social History Narrative    Lives in [place], [place].       Family History   Problem Relation Age of Onset     Heart disease Father      heart valve     Diabetes Maternal Grandmother      Objective:      Physical Exam  Vitals:    08/06/18 1422   BP: 162/87   Pulse: (!) 136   Temp: 98.2  F (36.8  C)     General - well developed, well nourished, appropriate for age. Appears no distress at this time  Abdomen - mildly obese soft, non-tender, no hepatosplenomegaly, no masses.   - no flank tenderness, no suprapubic tenderness, kidney and bladder non-palpable  MSK - normal spinal curvature. no spinal tenderness. normal gait. muscular strength intact.  Psych - oriented to time, place, and person, normal mood and affect.      Labs   Urinalysis POC (Office):  Blood UA   Date Value Ref Range Status   01/14/2015 Large Negative Final     Nitrite, UA   Date Value Ref Range Status   07/31/2018 Negative Negative Final   07/27/2018 Negative Negative Final   01/14/2015 Negative Negative Final   01/06/2015 Negative Negative Final     Leukocytes, UA    Date Value Ref Range Status   01/14/2015 Small (!) Negative Final     pH UA   Date Value Ref Range Status   01/14/2015 7.0 4.5 - 8.0 Final       Lab Urinalysis:  Blood, UA   Date Value Ref Range Status   07/31/2018 Large  (!) Negative Final   07/27/2018 Large (!) Negative Final   01/06/2015 Large (!) Negative Final     Nitrite, UA   Date Value Ref Range Status   07/31/2018 Negative Negative Final   07/27/2018 Negative Negative Final   01/14/2015 Negative Negative Final   01/06/2015 Negative Negative Final     Leukocytes, UA   Date Value Ref Range Status   07/31/2018 Small (!) Negative Final   07/27/2018 Negative Negative Final   01/06/2015 Negative Negative Final     pH, UA   Date Value Ref Range Status   07/31/2018 6.5 4.5 - 8.0 Final   07/27/2018 8.0 4.5 - 8.0 Final   01/06/2015 7.0 4.5 - 8.0 Final

## 2021-06-19 NOTE — ANESTHESIA POSTPROCEDURE EVALUATION
Patient: Sade Noyola  CYSTOSCOPY, LEFT  URETEROSCOPY, LASER LITHOTRIPSY STENT REMOVAL,  STENT INSERTION  Anesthesia type: general    Patient location: PACU  Last vitals:   Vitals:    07/31/18 1750   BP: 148/73   Pulse: (!) 106   Resp: 16   Temp:    SpO2: 97%     Post vital signs: stable  Level of consciousness: awake and responds to simple questions  Post-anesthesia pain: pain controlled  Post-anesthesia nausea and vomiting: no  Pulmonary: unassisted, return to baseline  Cardiovascular: stable and blood pressure at baseline  Hydration: adequate  Anesthetic events: no    QCDR Measures:  ASA# 11 - Ivonne-op Cardiac Arrest: ASA11B - Patient did NOT experience unanticipated cardiac arrest  ASA# 12 - Ivonne-op Mortality Rate: ASA12B - Patient did NOT die  ASA# 13 - PACU Re-Intubation Rate: ASA13B - Patient did NOT require a new airway mgmt  ASA# 10 - Composite Anes Safety: ASA10A - No serious adverse event    Additional Notes: patient has SEVERE anxiety related to her health and her blood pressure.  Her father passed away and this anxiety is related.  She is also a therapist herself.

## 2021-06-19 NOTE — PROGRESS NOTES
Patient educated regarding stent removal procedure and possible symptoms after removal.  Patient voiced understanding of information.  Handout given to patient.  Consent form signed.  Geni Hernandez RN    [facility] Timeout    Correct patient?: Yes  Correct site?:  Yes  Correct procedure?:  Yes  Correct laterality?:  Left  Consents verified?:  Yes  Relevant lab results available?:  Yes        Geni Hernandez RN

## 2021-06-19 NOTE — ANESTHESIA CARE TRANSFER NOTE
Last vitals:   Vitals:    07/31/18 1643   BP: 161/71   Pulse: (!) 126   Resp: 16   Temp: 36.5  C (97.7  F)   SpO2: 100%     Patient's level of consciousness is drowsy  Spontaneous respirations: yes  Maintains airway independently: yes  Dentition unchanged: yes  Oropharynx: oropharynx clear of all foreign objects    QCDR Measures:  ASA# 20 - Surgical Safety Checklist: WHO surgical safety checklist completed prior to induction  PQRS# 430 - Adult PONV Prevention: 4558F - Pt received => 2 anti-emetic agents (different classes) preop & intraop  ASA# 8 - Peds PONV Prevention: NA - Not pediatric patient, not GA or 2 or more risk factors NOT present  PQRS# 424 - Ivonne-op Temp Management: 4559F - At least one body temp DOCUMENTED => 35.5C or 95.9F within required timeframe  PQRS# 426 - PACU Transfer Protocol: - Transfer of care checklist used  ASA# 14 - Acute Post-op Pain: ASA14B - Patient did NOT experience pain >= 7 out of 10

## 2021-06-19 NOTE — ANESTHESIA POSTPROCEDURE EVALUATION
Patient: Sade GONZALEZ Dennys  CYSTOSCOPY, RETROGRADE  PYELOGRAM  AND LEFT STENT INSERTION  Anesthesia type: general    Patient location: PACU  Last vitals:   Vitals:    07/27/18 2050   BP: (!) 142/100   Pulse: (!) 109   Resp: 20   Temp: 35.9  C (96.7  F)   SpO2: 98%     Post vital signs: stable  Level of consciousness: awake, alert and responds to simple questions  Post-anesthesia pain: pain controlled  Post-anesthesia nausea and vomiting: no  Pulmonary: unassisted, return to baseline  Cardiovascular: stable and blood pressure at baseline  Hydration: adequate  Anesthetic events: no    QCDR Measures:  ASA# 11 - Ivonne-op Cardiac Arrest: ASA11B - Patient did NOT experience unanticipated cardiac arrest  ASA# 12 - Ivonne-op Mortality Rate: ASA12B - Patient did NOT die  ASA# 13 - PACU Re-Intubation Rate: ASA13B - Patient did NOT require a new airway mgmt  ASA# 10 - Composite Anes Safety: ASA10A - No serious adverse event    Additional Notes:

## 2021-06-21 NOTE — PROGRESS NOTES
Assessment/Plan:        Diagnoses and all orders for this visit:    Urinary calculus  -     Patient Stated Goal: Prevent further stones  -     Calcium Oxalate Stone Prevention Education  -     Stone Formation, 24 Hour Urine x1; Future; Expected date: 1/28/19  -     CT Abdomen Pelvis Without Oral Without IV Contrast; Future; Expected date: 10/29/19      Stone Management Plan  \[facility] [place] [facility]\"" Stone Management 7/31/2018 8/6/2018 10/29/2018   Urinary Tract Infection No suspicion of infection No suspicion of infection No suspicion of infection   Renal Colic Inadequate outpatient management of symptoms Well controlled symptoms Well controlled symptoms   Renal Failure No suspicion of renal failure No suspicion of renal failure No suspicion of renal failure   R Stone Event No current event No current event No current event   Resolved date - - -   R Post-op status - - -   L Stone Event Established event Established event No current event   Post-op status Unanticipated Clinic Visit Stent Removal -   L Current Plan - - -   Observe rationale - - -             Subjective:      HPI  Ms. Sade Noyola is a 43 y.o.  female returning to the [facility] [place] for follow up of her stone disease.    She has done well in the interim. She returns for stone risk evaluation. She has brushite stones. Despite our recommendations to remain on her usual diet she has adopted an aggressive low sodium, low oxalate, high fluid diet.    Serum chemistries are essentially normal. Two 24 hour urines demonstrate very mild hypercalciuria but are otherwise normal.     It is difficult to interpret her results with the exception of being reassured that, at least at this time, they are low risk. 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    Will recheck a 24 hour urine in about 3 months to see if she can sustain current habits. Also placed an order for follow up CT  in 12 months.     ROS   Review of systems is negative except for HPI.    Past Medical History:   Diagnosis Date     Kidney stones        Past Surgical History:   Procedure Laterality Date     LITHOTRIPSY       NH CYSTO/URETERO W/LITHOTRIPSY &INDWELL STENT INSRT Left 7/31/2018    Procedure: CYSTOSCOPY, LEFT  URETEROSCOPY, LASER LITHOTRIPSY STENT REMOVAL,  STENT INSERTION;  Surgeon: Aaron Ibarra MD;  Location: [facility];  Service: Urology       Current Outpatient Prescriptions   Medication Sig Dispense Refill     cyclobenzaprine (FLEXERIL) 10 MG tablet Take 5 mg by mouth at bedtime as needed.  0     lactobacillus rhamnosus GG (CULTURELLE) 10-15 Billion cell capsule Take 1 capsule by mouth daily.       omega-3/dha/epa/fish oil (FISH OIL-OMEGA-3 FATTY ACIDS) 300-1,000 mg capsule Take 2 g by mouth daily.       ranitidine (ZANTAC) 150 MG tablet Take 150 mg by mouth 2 (two) times a day.       diphenhydrAMINE-acetaminophen (TYLENOL PM)  mg Tab Take 0.5 tablets by mouth at bedtime.        diphenoxylate-atropine (LOMOTIL) 2.5-0.025 mg per tablet Take 1 tablet by mouth 4 (four) times a day as needed for diarrhea. 12 tablet 0     multivitamin therapeutic tablet Take 1 tablet by mouth daily.       No current facility-administered medications for this visit.        Allergies   Allergen Reactions     Ciprofloxacin Hives     Claritin [Loratadine] Hives     Flushing. Tolerates Benadryl fine.      Penicillin G Benzathine Hives     Patient can take keflex without problems     Sulfa (Sulfonamide Antibiotics) Hives     Compazine [Prochlorperazine Edisylate] Anxiety       Social History     Social History     Marital status:      Spouse name: N/A     Number of children: N/A     Years of education: N/A     Occupational History     Not on file.     Social History Main Topics     Smoking status: Never Smoker     Smokeless tobacco: Never Used     Alcohol use No     Drug use: No     Sexual activity: Not on file      Other Topics Concern     Not on file     Social History Narrative    Lives in [place], [place].       Family History   Problem Relation Age of Onset     Heart disease Father      heart valve     Diabetes Maternal Grandmother      Objective:      Physical Exam  Vitals:    10/29/18 1039   BP: 130/80   Pulse: 80   Temp: 97.6  F (36.4  C)     General - well developed, well nourished, appropriate for age. Appears no distress at this time  Abdomen - mildly obese soft, non-tender, no hepatosplenomegaly, no masses.   - no flank tenderness, no suprapubic tenderness, kidney and bladder non-palpable  MSK - normal spinal curvature. no spinal tenderness. normal gait. muscular strength intact.  Psych - oriented to time, place, and person, normal mood and affect.      Labs   Urinalysis POC (Office):  Blood UA   Date Value Ref Range Status   01/14/2015 Large Negative Final     Nitrite, UA   Date Value Ref Range Status   08/06/2018 Negative Negative Final   07/31/2018 Negative Negative Final   07/27/2018 Negative Negative Final     Leukocytes, UA    Date Value Ref Range Status   01/14/2015 Small (!) Negative Final     pH UA   Date Value Ref Range Status   01/14/2015 7.0 4.5 - 8.0 Final       Lab Urinalysis:  Blood, UA   Date Value Ref Range Status   08/06/2018 Large (!) Negative Final   07/31/2018 Large (!) Negative Final   07/27/2018 Large (!) Negative Final     Nitrite, UA   Date Value Ref Range Status   08/06/2018 Negative Negative Final   07/31/2018 Negative Negative Final   07/27/2018 Negative Negative Final     Leukocytes, UA   Date Value Ref Range Status   08/06/2018 Small (!) Negative Final   07/31/2018 Small (!) Negative Final   07/27/2018 Negative Negative Final     pH, UA   Date Value Ref Range Status   08/06/2018 6.5 5.0 - 8.0 Final   07/31/2018 6.5 4.5 - 8.0 Final   07/27/2018 8.0 4.5 - 8.0 Final    and Stone prevention labs   Serum chemistries   Creatinine   Date Value Ref Range Status   10/16/2018 0.77 0.60 - 1.10  mg/dL Final   07/31/2018 0.83 0.60 - 1.10 mg/dL Final   07/27/2018 0.79 0.60 - 1.10 mg/dL Final     Potassium   Date Value Ref Range Status   07/31/2018 4.0 3.5 - 5.0 mmol/L Final   07/27/2018 3.8 3.5 - 5.0 mmol/L Final   02/16/2015 4.0 3.5 - 5.0 mmol/L Final     Calcium   Date Value Ref Range Status   10/16/2018 10.0 8.5 - 10.5 mg/dL Final   07/31/2018 9.8 8.5 - 10.5 mg/dL Final   07/27/2018 9.7 8.5 - 10.5 mg/dL Final     Phosphorus   Date Value Ref Range Status   10/16/2018 2.9 2.5 - 4.5 mg/dL Final   02/16/2015 3.1 2.5 - 4.5 mg/dL Final   02/16/2015 3.3 2.5 - 4.5 mg/dL Final     Uric Acid   Date Value Ref Range Status   02/16/2015 3.8 2.0 - 7.5 mg/dL Final   , Calcium metabolism   Calcium, Ionized Measured   Date Value Ref Range Status   10/16/2018 1.20 1.11 - 1.30 mmol/L Final   02/16/2015 1.28 1.11 - 1.30 mmol/L Final      PTH   Date Value Ref Range Status   10/16/2018 77 10 - 86 pg/mL Final   02/16/2015 94 (H) 10 - 86 pg/mL Final     No results found for: [id number]  and 24 hour urine   Calcium, 24H Urine   Date Value Ref Range Status   10/16/2018 265 20 - 275 mg/24hr Final     Comment:       Hypercalciuria >350  Values are for persons with  average daily calcium intake  (600-800 mg/day)   09/25/2018 215 20 - 275 mg/24hr Final     Comment:       Hypercalciuria >350  Values are for persons with  average daily calcium intake  (600-800 mg/day)     Sodium, 24 Hour Urine   Date Value Ref Range Status   10/16/2018 70 40 - 217 mmol/24hr Final   09/25/2018 85 40 - 217 mmol/24hr Final     Citrate, 24 Hour Urine   Date Value Ref Range Status   10/16/2018 605 328 - 1191 mg/24hr Final     Comment:       Reference Ranges are not  established for 0-19 years  or >60 years of age.   09/25/2018 613 328 - 1191 mg/24hr Final     Comment:       Reference Ranges are not  established for 0-19 years  or >60 years of age.     Oxalate, 24 Hour Urine   Date Value Ref Range Status   10/16/2018 30.8 7.0 - 44.0 mg/24hr Final    09/25/2018 33.1 7.0 - 44.0 mg/24hr Final     pH, Urine   Date Value Ref Range Status   10/16/2018 6.5 4.5 - 8.0 Final   09/25/2018 6.5 4.5 - 8.0 Final     Urine Volume   Date Value Ref Range Status   10/16/2018 2500 mL Final   09/25/2018 2825 mL Final

## 2021-07-03 NOTE — ANESTHESIA PREPROCEDURE EVALUATION
Anesthesia Preprocedure Evaluation by Tariq Hull MD at 7/27/2018  6:46 PM     Author: Tariq Hull MD Service: -- Author Type: Physician    Filed: 7/27/2018  6:47 PM Date of Service: 7/27/2018  6:46 PM Status: Addendum    : Tariq Hull MD (Physician)    Related Notes: Original Note by Tariq Hull MD (Physician) filed at 7/27/2018  6:47 PM       Anesthesia Evaluation      Patient summary reviewed   No history of anesthetic complications     Airway   Mallampati: I  Neck ROM: full   Pulmonary - negative ROS and normal exam    breath sounds clear to auscultation                         Cardiovascular - negative ROS and normal exam  Rhythm: regular  Rate: normal,         Neuro/Psych - negative ROS     Endo/Other - negative ROS      GI/Hepatic/Renal    (+)   chronic renal disease,           Dental                             Anesthesia Plan  Planned anesthetic: general LMA    ASA 2 - emergent   Induction: intravenous   Anesthetic plan and risks discussed with: patient    Post-op plan: routine recovery

## 2021-11-22 NOTE — TELEPHONE ENCOUNTER
Per [facility]- Drug not covered by patient plan. The preferred alternative is [facility] Please call/fax the pharmacy to change medication along with strength, directions quanity, and refills.     Yes

## 2022-04-14 ENCOUNTER — VIRTUAL VISIT (OUTPATIENT)
Dept: FAMILY MEDICINE | Facility: CLINIC | Age: 47
End: 2022-04-14
Payer: COMMERCIAL

## 2022-04-14 DIAGNOSIS — F43.22 REACTION, ADJUSTMENT, WITH ANXIOUS MOOD: ICD-10-CM

## 2022-04-14 DIAGNOSIS — Z69.11 COUNSELING FOR VICTIM OF SPOUSAL AND PARTNER ABUSE: Primary | ICD-10-CM

## 2022-04-14 DIAGNOSIS — Z12.11 SCREEN FOR COLON CANCER: ICD-10-CM

## 2022-04-14 PROCEDURE — 99213 OFFICE O/P EST LOW 20 MIN: CPT | Mod: TEL | Performed by: FAMILY MEDICINE
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
## 2022-06-28 DIAGNOSIS — S13.9XXA SPRAIN OF NECK, INITIAL ENCOUNTER: ICD-10-CM

## 2022-06-28 RX ORDER — CYCLOBENZAPRINE HCL 10 MG
10 TABLET ORAL 2 TIMES DAILY PRN
Qty: 30 TABLET | Refills: 11 | Status: SHIPPED | OUTPATIENT
Start: 2022-06-28 | End: 2023-06-16

## 2022-06-28 NOTE — TELEPHONE ENCOUNTER
Patient requests 2 months worth of medicaiton. Scheduled annual for August. Unable to come before that.    Call with any questions or once completed.  [id number] - [place], [place] - [address] AT [facility] [address]

## 2022-07-01 NOTE — PROGRESS NOTES
"  SUBJECTIVE:                                                    Sade Noyola is a 41 year old female who presents to clinic today for the following health issues:      Rash     Onset: Monday and     Description:   Location:  forearms  Character: raised, red  Itching (Pruritis): YES    Progression of Symptoms:  worsening    Accompanying Signs & Symptoms:  Fever: no   Body aches or joint pain: no   Sore throat symptoms: no   Recent cold symptoms: no    History:   Previous similar rash: no     Precipitating factors:   Exposure to similar rash: no   New exposures: None and medication antibotic   Recent travel: no      Therapies Tried and outcome: Benadryl       Just finished keflex but has other possible triggers    Problem list and histories reviewed & adjusted, as indicated.  Additional history: as documented    Labs reviewed in EPIC    Reviewed and updated as needed this visit by clinical staff       Reviewed and updated as needed this visit by Provider         ROS:  Constitutional, HEENT, cardiovascular, pulmonary, gi and gu systems are negative, except as otherwise noted.    OBJECTIVE:                                                    Pulse 109  Temp 98.8  F (37.1  C) (Oral)  Ht 5' 3\" (1.6 m)  SpO2 99%  There is no height or weight on file to calculate BMI.  GENERAL: healthy, alert and no distress  NECK: no adenopathy, no asymmetry, masses, or scars and thyroid normal to palpation  RESP: lungs clear to auscultation - no rales, rhonchi or wheezes  SKIN: blanching erythema patches on arms/ cheat -          ASSESSMENT/PLAN:                                                            1. Hives  Try OTC antihistamine  Follow up only if unimproved.       Chi Gates MD  [facility]    " Pt needs refill of budesonide for nebulizer

## 2023-05-17 ENCOUNTER — MYC MEDICAL ADVICE (OUTPATIENT)
Dept: FAMILY MEDICINE | Facility: CLINIC | Age: 48
End: 2023-05-17
Payer: COMMERCIAL

## 2023-05-17 ENCOUNTER — TELEPHONE (OUTPATIENT)
Dept: FAMILY MEDICINE | Facility: CLINIC | Age: 48
End: 2023-05-17

## 2023-05-17 NOTE — TELEPHONE ENCOUNTER
Patient Returning Call    Reason for call:  Trouble attaching documents to send over to you. Seeing if there is another way to send the documents    Information relayed to patient:  Will send msg    Patient has additional questions:  Yes    What are your questions/concerns:  [employer] wouldn't attach file to send the documents over in the message field.     Who does the patient want to speak with:  Provider    Is an  needed?:  No      Could we send this information to you in [employer] or would you prefer to receive a phone call?:   Patient would prefer a phone call   Okay to leave a detailed message?: Yes at Cell number on file:    Telephone Information:   Mobile [phone]

## 2023-05-18 NOTE — TELEPHONE ENCOUNTER
"Called pt and unable to leave message d/t voicemail box being full when they callback to relay message from Dr. Gates    \"Please let her know that I am leaving today at 400 PM sharp and will be gone until Tuesday\"    Thanks!  Aaron Oleary RN   [facility]    "

## 2023-06-01 ENCOUNTER — HEALTH MAINTENANCE LETTER (OUTPATIENT)
Age: 48
End: 2023-06-01

## 2023-06-16 DIAGNOSIS — S13.9XXA SPRAIN OF NECK, INITIAL ENCOUNTER: ICD-10-CM

## 2023-06-16 DIAGNOSIS — R11.0 CHRONIC NAUSEA: ICD-10-CM

## 2023-06-16 RX ORDER — ONDANSETRON 4 MG/1
4 TABLET, FILM COATED ORAL EVERY 8 HOURS PRN
Qty: 90 TABLET | Refills: 1 | Status: SHIPPED | OUTPATIENT
Start: 2023-06-16 | End: 2024-01-18

## 2023-06-16 RX ORDER — CYCLOBENZAPRINE HCL 10 MG
10 TABLET ORAL 2 TIMES DAILY PRN
Qty: 90 TABLET | Refills: 11 | Status: SHIPPED | OUTPATIENT
Start: 2023-06-16 | End: 2024-06-20

## 2023-06-16 NOTE — TELEPHONE ENCOUNTER
Requested Prescriptions   Pending Prescriptions Disp Refills     cyclobenzaprine (FLEXERIL) 10 MG tablet 90 tablet 11     Sig: Take 1 tablet (10 mg) by mouth 2 times daily as needed for muscle spasms       There is no refill protocol information for this order        ondansetron (ZOFRAN) 4 MG tablet 90 tablet 1     Sig: Take 1 tablet (4 mg) by mouth every 8 hours as needed for nausea       There is no refill protocol information for this order        Routing refill request to provider for review/approval because:  Drug not on the [facility] refill protocol     Pt states that she will schedule a appointment as soon as she gets her insurance.     Nisha Luz RN  [facility]

## 2023-11-25 ENCOUNTER — TRANSFERRED RECORDS (OUTPATIENT)
Dept: HEALTH INFORMATION MANAGEMENT | Facility: CLINIC | Age: 48
End: 2023-11-25
Payer: COMMERCIAL

## 2024-01-18 DIAGNOSIS — R11.0 CHRONIC NAUSEA: ICD-10-CM

## 2024-01-18 RX ORDER — ONDANSETRON 4 MG/1
4 TABLET, FILM COATED ORAL EVERY 8 HOURS PRN
Qty: 90 TABLET | Refills: 1 | Status: SHIPPED | OUTPATIENT
Start: 2024-01-18

## 2024-01-18 NOTE — TELEPHONE ENCOUNTER
Pt stated that she will have her new insurance March. She will make an appointment as soon as she gets it. Pt is aware she is due.     Nisha Luz RN  [facility]

## 2024-06-11 NOTE — TELEPHONE ENCOUNTER
Dr. Fang Taylor notified of internal medicine consult. Will see patient tomorrow. If RN needs anything before then please call him. [facility]    Member: [phone]    Pleas add to chart

## 2024-06-13 ENCOUNTER — VIRTUAL VISIT (OUTPATIENT)
Dept: FAMILY MEDICINE | Facility: CLINIC | Age: 49
End: 2024-06-13
Payer: COMMERCIAL

## 2024-06-13 DIAGNOSIS — F40.243 FEAR OF FLYING: Primary | ICD-10-CM

## 2024-06-13 DIAGNOSIS — Z87.898 HX OF MOTION SICKNESS: ICD-10-CM

## 2024-06-13 PROCEDURE — 99443 PR PHYSICIAN TELEPHONE EVALUATION 21-30 MIN: CPT | Mod: 93 | Performed by: FAMILY MEDICINE

## 2024-06-13 RX ORDER — MECLIZINE HYDROCHLORIDE 25 MG/1
25 TABLET ORAL 3 TIMES DAILY PRN
Qty: 10 TABLET | Refills: 0 | Status: SHIPPED | OUTPATIENT
Start: 2024-06-13

## 2024-06-13 RX ORDER — LORAZEPAM 0.5 MG/1
0.5 TABLET ORAL EVERY 6 HOURS PRN
Qty: 10 TABLET | Refills: 0 | Status: SHIPPED | OUTPATIENT
Start: 2024-06-13

## 2024-06-13 NOTE — PROGRESS NOTES
Sade is a 49 year old who is being evaluated via a billable telephone visit.    What phone number would you like to be contacted at? cell  How would you like to obtain your AVS? Casa  Originating Location (pt. 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    HPI      Fear of flying/ nausea  Onset/Duration: years  Description: anxiety  Depression (if yes, do PHQ-9): No  Anxiety ( : YES  Accompanying Signs & Symptoms:  Still participating in activities that you used to enjoy: YES  Fatigue: No  Irritability: No  Difficulty concentrating: No  Changes in appetite: No  Problems with sleep: No  Heart racing/beating fast: YES  Abnormally elevated, expansive, or irritable mood: No    Thoughts of hurting yourself or others: No  History:  Recent stress or major life event: YES  Prior depression or anxiety: yes  Family history of depression or anxiety: YES  Alcohol/drug use: No  Difficulty sleeping: No  Precipitating or alleviating factors: None  Therapies tried and outcome: medication(s) \zofran       No data to display                   No data to display                  Review of Systems  Constitutional, HEENT, cardiovascular, pulmonary, gi and gu systems are negative, except as otherwise noted.      Objective           Vitals:  No vitals  were obtained today due to virtual visit.    Physical Exam   General: Alert and no distress //Respiratory: No audible wheeze, cough, or shortness of breath // Psychiatric:  Appropriate affect, tone, and pace of words      Orders Only on 06/14/2021   Component Date Value Ref Range Status    Sodium 06/14/2021 137  133 - 144 mmol/L Final    Potassium 06/14/2021 4.9  3.4 - 5.3 mmol/L Final    Chloride 06/14/2021 105  94 - 109 mmol/L Final    Carbon Dioxide 06/14/2021 26  20 - 32 mmol/L Final    Anion Gap 06/14/2021 6  3 - 14 mmol/L Final    Glucose 06/14/2021 101 (H)  70 - 99 mg/dL Final    Urea Nitrogen 06/14/2021 13  7 - 30 mg/dL Final    Creatinine 06/14/2021 0.84  0.52 - 1.04 mg/dL Final    GFR Estimate 06/14/2021 83  >60 mL/min/[1.73_m2] Final    Comment: Non  GFR Calc  Starting 12/18/2018, serum creatinine based estimated GFR (eGFR) will be   calculated using the Chronic Kidney Disease Epidemiology Collaboration   (CKD-EPI) equation.      GFR Estimate If Black 06/14/2021 >90  >60 mL/min/[1.73_m2] Final    Comment:  GFR Calc  Starting 12/18/2018, serum creatinine based estimated GFR (eGFR) will be   calculated using the Chronic Kidney Disease Epidemiology Collaboration   (CKD-EPI) equation.      Calcium 06/14/2021 9.3  8.5 - 10.1 mg/dL Final    Bilirubin Total 06/14/2021 0.8  0.2 - 1.3 mg/dL Final    Albumin 06/14/2021 3.8  3.4 - 5.0 g/dL Final    Protein Total 06/14/2021 7.2  6.8 - 8.8 g/dL Final    Alkaline Phosphatase 06/14/2021 61  40 - 150 U/L Final    ALT 06/14/2021 19  0 - 50 U/L Final    AST 06/14/2021 12  0 - 45 U/L Final    TSH 06/14/2021 1.47  0.40 - 4.00 mU/L Final    Cholesterol 06/14/2021 240 (H)  <200 mg/dL Final    Desirable:       <200 mg/dl    Triglycerides 06/14/2021 98  <150 mg/dL Final    HDL Cholesterol 06/14/2021 79  >49 mg/dL Final    LDL Cholesterol Calculated 06/14/2021 142 (H)  <100 mg/dL Final    Comment: Above desirable:  100-129 mg/dl  Borderline High:   130-159 mg/dL  High:             160-189 mg/dL  Very high:       >189 mg/dl      Non HDL Cholesterol 06/14/2021 161 (H)  <130 mg/dL Final    Comment: Above Desirable:  130-159 mg/dl  Borderline high:  160-189 mg/dl  High:             190-219 mg/dl  Very high:       >219 mg/dl           Phone call duration: 25 minutes  Signed Electronically by: Chi Gates MD

## 2024-06-19 DIAGNOSIS — S13.9XXA SPRAIN OF NECK, INITIAL ENCOUNTER: ICD-10-CM

## 2024-06-19 NOTE — TELEPHONE ENCOUNTER
Refill request   Patient states she only takes 1 tablet daily so only filled rx a few times but rx  since it has been 1 year - last ordered 23  Ok with a reduced dose to 1 x daily     Will be scheduling an annual with you end of July when back in town   Order pended     Please advise   Irina TURNER RN  [facility]

## 2024-06-20 RX ORDER — CYCLOBENZAPRINE HCL 10 MG
10 TABLET ORAL 2 TIMES DAILY PRN
Qty: 90 TABLET | Refills: 11 | Status: SHIPPED | OUTPATIENT
Start: 2024-06-20

## 2024-06-27 ENCOUNTER — OFFICE VISIT (OUTPATIENT)
Dept: FAMILY MEDICINE | Facility: CLINIC | Age: 49
End: 2024-06-27
Payer: COMMERCIAL

## 2024-06-27 ENCOUNTER — TELEPHONE (OUTPATIENT)
Dept: FAMILY MEDICINE | Facility: CLINIC | Age: 49
End: 2024-06-27

## 2024-06-27 VITALS — HEART RATE: 102 BPM | OXYGEN SATURATION: 99 % | TEMPERATURE: 98.2 F

## 2024-06-27 DIAGNOSIS — L50.9 URTICARIA: ICD-10-CM

## 2024-06-27 DIAGNOSIS — R21 RASH: Primary | ICD-10-CM

## 2024-06-27 LAB — DEPRECATED S PYO AG THROAT QL EIA: NEGATIVE

## 2024-06-27 PROCEDURE — 99214 OFFICE O/P EST MOD 30 MIN: CPT | Performed by: FAMILY MEDICINE

## 2024-06-27 PROCEDURE — 87651 STREP A DNA AMP PROBE: CPT | Performed by: FAMILY MEDICINE

## 2024-06-27 RX ORDER — PREDNISONE 20 MG/1
20 TABLET ORAL DAILY
Qty: 5 TABLET | Refills: 0 | Status: SHIPPED | OUTPATIENT
Start: 2024-06-27 | End: 2024-07-02

## 2024-06-27 RX ORDER — CETIRIZINE HYDROCHLORIDE 10 MG/1
10 TABLET ORAL DAILY
COMMUNITY
Start: 2024-06-27

## 2024-06-27 ASSESSMENT — PAIN SCALES - GENERAL: PAINLEVEL: SEVERE PAIN (7)

## 2024-06-27 NOTE — TELEPHONE ENCOUNTER
Patient calling because she has a virtual appointment scheduled with Dr. Gates today for a rash on her face. She is wanting to know if it would be better to be seen in person.     Please give her a call back. If the appointment is switched to in-person, she would need a heads up because she will be driving from [place].     She will be out of town tomorrow, so she wouldn't be able to switch to a different day.    Korina Jurado RN  [facility]

## 2024-06-27 NOTE — PROGRESS NOTES
Assessment & Plan     Rash  No clear trigger  Start zyrtec 10 mg times 2-4 weeks, limit soap or products  Ok benadryl at HS  - Streptococcus A Rapid Screen w/Reflex to PCR - Clinic Collect  - Group A Streptococcus PCR Throat Swab    Urticaria  start  - prednisone (DELTASONE) 20 MG tablet; Take 1 tablet (20 mg) by mouth daily for 5 days            See Patient Instructions    Subjective   Sade is a 49 year old, presenting for the following health issues:  Rash        6/27/2024     4:47 PM   Additional Questions   Roomed by Gabriela ROWE     Rash  Associated symptoms include a rash.   History of Present Illness       Reason for visit:  Rash  Symptom onset:  3-7 days ago  Symptoms include:  Rash on face anf neck  Symptom intensity:  Moderate  Symptom progression:  Staying the same  Had these symptoms before:  No  What makes it worse:  Sun and heat    She eats 2-3 servings of fruits and vegetables daily.She consumes 0 sweetened beverage(s) daily.She exercises with enough effort to increase her heart rate 20 to 29 minutes per day.  She exercises with enough effort to increase her heart rate 4 days per week.   She is taking medications regularly.         Rash  Onset/Duration: 4 days  Description  Location: face/ neck    Has mild sore throat/ concerned about strep  Character: blotchy, blanches to palpitation, itchy  Itching: moderate  Intensity:  moderate  Progression of Symptoms:  worsening  Accompanying signs and symptoms:   Fever: No  Body aches or joint pain: No  Sore throat symptoms: No  Recent cold symptoms: YES  History:           Previous episodes of similar rash: None  New exposures:  None  Recent travel: No  Exposure to similar rash: No  Precipitating or alleviating factors: none  Therapies tried and outcome: hydrocortisone cream -        Under lots of stressors with home/ work  ' No breathing or swallowing issues      Review of Systems  Constitutional, HEENT, cardiovascular, pulmonary, gi and gu systems are  negative, except as otherwise noted.      Objective    Pulse 102   Temp 98.2  F (36.8  C) (Temporal)   LMP  (LMP Unknown)   SpO2 99%   There is no height or weight on file to calculate BMI.  Physical Exam   GENERAL: alert and mild distress  EYES: Eyes grossly normal to inspection, PERRL and conjunctivae and sclerae normal  HENT: ear canals and TM's normal, nose and mouth without ulcers or lesions  NECK: no adenopathy, no asymmetry, masses, or scars  RESP: lungs clear to auscultation - no rales, rhonchi or wheezes  CV: regular rate and rhythm, normal S1 S2, no S3 or S4, no murmur, click or rub, no peripheral edema  SKIN: maculopapular eruption - face and neck  NEURO: Normal strength and tone, mentation intact and speech normal  PSYCH: tearful, anxious, fatigued, judgement and insight intact, and appearance well groomed    Results for orders placed or performed in visit on 06/27/24   Streptococcus A Rapid Screen w/Reflex to PCR - Clinic Collect     Status: Normal    Specimen: Throat; Swab   Result Value Ref Range    Group A Strep antigen Negative Negative           Signed Electronically by: Chi Gates MD

## 2024-06-27 NOTE — TELEPHONE ENCOUNTER
Called pt to let know that we will be here.   Thanks,   Filippo Suarez RN  [facility]

## 2024-06-28 LAB — GROUP A STREP BY PCR: NOT DETECTED

## 2024-07-01 ENCOUNTER — TELEPHONE (OUTPATIENT)
Dept: FAMILY MEDICINE | Facility: CLINIC | Age: 49
End: 2024-07-01
Payer: COMMERCIAL

## 2024-07-01 DIAGNOSIS — R21 RASH: Primary | ICD-10-CM

## 2024-07-01 DIAGNOSIS — L50.9 URTICARIA: ICD-10-CM

## 2024-07-01 RX ORDER — PREDNISONE 5 MG/1
TABLET ORAL
Qty: 15 TABLET | Refills: 0 | Status: SHIPPED | OUTPATIENT
Start: 2024-07-01 | End: 2024-07-11

## 2024-07-01 NOTE — TELEPHONE ENCOUNTER
Pt is almost done with prednisone script ( one more day)  pt still having lots of itching, swelling is better, but is worried it will come back. But still red and bothersome.   Please advise, she is wondering if she should continue/ change medications.   Thanks,   Filippo Suarez RN  [facility]

## 2024-07-01 NOTE — TELEPHONE ENCOUNTER
I called patient   Jose do prednisone taper     Orders Placed This Encounter    predniSONE (DELTASONE) 5 MG tablet     Sig: Take 2 tablets (10 mg) by mouth daily for 5 days, THEN 1 tablet (5 mg) daily for 5 days.     Dispense:  15 tablet     Refill:  0     Follow up by phone in 10 days if not improving.

## 2024-07-08 DIAGNOSIS — L71.9 ROSACEA: ICD-10-CM

## 2024-07-08 NOTE — TELEPHONE ENCOUNTER
Attention Dr. Gates     New Medication Request-Metro gel for Rosacea    Patient was seen for Hives, that cleared up but is having a Rosacea flair. In 2016 Dr. Gates prescribed or her Derm prescribed Metro gel. Since her insurance changed, She can no longer go to derm and does think Dr. Santos prescribed it, or would he be willing to prescribe it without another office visit? She said her cheeks are burning and nose, and would like to try to get it asap to help. Thank you.   (Did not want triage)    Contacts       Contact Date/Time Type Contact Phone/Fax    07/08/2024 05:28 PM CDT Phone (Incoming) Sade Noyola (Self) [phone] (M)            What medication are you requesting?:Metro gel     Reason for medication request:Rosacea Flair     Have you taken this medication before?: Yes     Controlled Substance Agreement on file:   CSA -- Patient Level:    CSA: None found at the patient level.         Patient offered an appointment? No, patient did not wants since she was just seen for the hives     Preferred Pharmacy:[id number] - [place], [place] - [address]  [address]  [facility] [address]  Phone: [phone] Fax: [phone]      Okay to leave a detailed message?: Yes at Cell number on file:    Telephone Information:   Mobile [phone]

## 2024-07-09 NOTE — TELEPHONE ENCOUNTER
LVM requesting call back to verify the medication Pt is requesting     Thanks   Irina TURNER RN  [facility]

## 2024-07-11 RX ORDER — METRONIDAZOLE 7.5 MG/G
GEL TOPICAL 2 TIMES DAILY
Qty: 45 G | Refills: 1 | Status: SHIPPED | OUTPATIENT
Start: 2024-07-11

## 2024-12-02 DIAGNOSIS — R11.0 CHRONIC NAUSEA: ICD-10-CM

## 2024-12-02 RX ORDER — ONDANSETRON 4 MG/1
4 TABLET, FILM COATED ORAL EVERY 8 HOURS PRN
Qty: 90 TABLET | Refills: 1 | Status: SHIPPED | OUTPATIENT
Start: 2024-12-02

## 2025-04-04 ENCOUNTER — TELEPHONE (OUTPATIENT)
Dept: FAMILY MEDICINE | Facility: CLINIC | Age: 50
End: 2025-04-04
Payer: COMMERCIAL

## 2025-04-08 NOTE — TELEPHONE ENCOUNTER
Prior Authorization Not Needed per Insurance    Medication: ONDANSETRON HCL 4 MG PO TABS  Insurance Company: [employer] Non-Specialty PA's - Phone [phone] Fax [phone]  Expected CoPay: $    Pharmacy Filling the Rx: [id number] [place], [place] - [address] AT [facility] [address]  Pharmacy Notified: YES  Patient Notified: YES

## 2025-04-09 NOTE — TELEPHONE ENCOUNTER
Prior Authorization Approval    Medication: ONDANSETRON HCL 4 MG PO TABS  Authorization Effective Date:  04/09/2025  Authorization Expiration Date:  10/06/2025  Approved Dose/Quantity: Qty 68 tablets for 30 day supply is all they will approve or Qty 204 tablets for home delivery   Reference #: [id number]   Insurance Company: [employer] Non-Specialty PA's - Phone [phone] Fax [phone]  Which Pharmacy is filling the prescription: [id number] [place], [place] - [address] AT [facility] [address]  Pharmacy Notified: YES  Patient Notified: advised filling pharmacy to contact pt

## 2025-04-09 NOTE — TELEPHONE ENCOUNTER
Pt calling back stating insurance is covering 9 pills for 30 days not the written rx amount.    Please start PA again with currently dispense amount, pt is out of medication.    Thanks!  Aaron PUCKETT RN   [facility]

## 2025-07-16 ENCOUNTER — TELEPHONE (OUTPATIENT)
Dept: UROLOGY | Facility: CLINIC | Age: 50
End: 2025-07-16
Payer: COMMERCIAL

## 2025-07-16 NOTE — TELEPHONE ENCOUNTER
Spoke with patient who will speak with pharmacist through her primary care and if she has further questions she will call back to schedule an appointment. Geni Hernandez RN

## 2025-07-16 NOTE — TELEPHONE ENCOUNTER
[facility] Call Center    Phone Message    May a detailed message be left on voicemail: no     Reason for Call: Other: Patient called and would like to ask the clinic about a supplement and how it processes? I told her that they may not be able to give advice due to her not being seen for five years. Please call patient back to discuss.      Action Taken: Other: MPLW URO    Travel Screening: Not Applicable     Date of Service:

## 2025-09-02 DIAGNOSIS — R11.0 CHRONIC NAUSEA: ICD-10-CM

## 2025-09-02 RX ORDER — ONDANSETRON 4 MG/1
4 TABLET, FILM COATED ORAL EVERY 8 HOURS PRN
Qty: 90 TABLET | Refills: 0 | Status: SHIPPED | OUTPATIENT
Start: 2025-09-02